# Patient Record
Sex: FEMALE | Race: BLACK OR AFRICAN AMERICAN | NOT HISPANIC OR LATINO | URBAN - METROPOLITAN AREA
[De-identification: names, ages, dates, MRNs, and addresses within clinical notes are randomized per-mention and may not be internally consistent; named-entity substitution may affect disease eponyms.]

---

## 2020-06-29 ENCOUNTER — OFFICE VISIT (OUTPATIENT)
Dept: URBAN - METROPOLITAN AREA TELEHEALTH 2 | Facility: TELEHEALTH | Age: 55
End: 2020-06-29
Payer: COMMERCIAL

## 2020-06-29 DIAGNOSIS — K92.1 RECTAL BLEEDING: ICD-10-CM

## 2020-06-29 DIAGNOSIS — K64.8 INTERNAL HEMORRHOID: ICD-10-CM

## 2020-06-29 DIAGNOSIS — K64.9 HEMORRHOIDS: ICD-10-CM

## 2020-06-29 PROCEDURE — 99213 OFFICE O/P EST LOW 20 MIN: CPT | Performed by: INTERNAL MEDICINE

## 2020-06-29 RX ORDER — SODIUM, POTASSIUM,MAG SULFATES 17.5-3.13G
177ML SOLUTION, RECONSTITUTED, ORAL ORAL
Qty: 177 MILLILITER | Refills: 0 | OUTPATIENT
Start: 2020-06-29 | End: 2020-06-30

## 2020-06-29 RX ORDER — MYCOPHENOLATE MOFETIL 250 MG/1
TAKE 1 CAPSULE BY ORAL ROUTE 2 TIMES A DAY FOR 90 DAYS CAPSULE ORAL 2
Qty: 180 | Refills: 1 | Status: ACTIVE | COMMUNITY
Start: 2020-02-14 | End: 2020-08-12

## 2020-06-29 RX ORDER — MINERAL OIL, PETROLATUM, PHENYLEPHRINE HCL 2.5; 140; 749 MG/G; MG/G; MG/G
1 APPLICATION OINTMENT TOPICAL TWICE A DAY
Qty: 1 TUBE | Refills: 1 | OUTPATIENT
Start: 2020-06-29 | End: 2020-07-13

## 2020-06-29 NOTE — HPI-OTHER HISTORIES
This is a 55 yo female with a PMHx of Autoimmune Hepatitis who reports consitipation after taking codiene for a tooth removal   She has also noted rectal bleeding she beleives is due to hemorrhoids.  She denies rectal pain.  Tucks had slightly improved her symptoms.  She is concerned about the larger size of the rectum and she wants to ensure the bleeding is not due to anything else.  She denies  abdominal pain and weight loss.

## 2020-07-02 ENCOUNTER — TELEPHONE ENCOUNTER (OUTPATIENT)
Dept: URBAN - METROPOLITAN AREA CLINIC 92 | Facility: CLINIC | Age: 55
End: 2020-07-02

## 2020-07-02 NOTE — HPI-OTHER HISTORIES
Olga Apologize as just saw this as suspect sent to us I suspect as we did our EMR conversion from [facility] to [facility] and they are still sorting some notes from the end of gw to ecw time.  Don't see date of the covid 19 when it was positive and hope you are doing well. Saw also thatv you dropped dose in march and those labs maybe done then in june normal still one a day. I am not opposed to that lower dose given the covid 19 issue rodrigo with labs stable but wanted to see how you are doing. The risk of dropping dose is that you could flare and need more which would be issue with covid 19. Wbc 6.0 hg 13.6 plat 256. Alb 4.7 and bun 17 and co2 27 and glu 101 and na 140 and alt 19 and ca 10.0, cr 0.9 and k 4.3 and alk 106 slightly up for lab, ast 25 and tb 0.5. Tried to call but went to [facility] and so am sending this note. Let us know how you are doing and can you do labs now.  Dr Lc Hayes

## 2020-07-19 ENCOUNTER — TELEPHONE ENCOUNTER (OUTPATIENT)
Dept: URBAN - METROPOLITAN AREA CLINIC 92 | Facility: CLINIC | Age: 55
End: 2020-07-19

## 2020-08-07 ENCOUNTER — OFFICE VISIT (OUTPATIENT)
Dept: URBAN - METROPOLITAN AREA SURGERY CENTER 16 | Facility: SURGERY CENTER | Age: 55
End: 2020-08-07

## 2020-09-19 ENCOUNTER — TELEPHONE ENCOUNTER (OUTPATIENT)
Dept: URBAN - METROPOLITAN AREA CLINIC 92 | Facility: CLINIC | Age: 55
End: 2020-09-19

## 2020-09-19 NOTE — HPI-TODAY'S VISIT:
This is a scheduled follow-up appointment for this patient, a 54 year old /Black female, after a previous visit on feb 14 2020, for an evaluation for elevated liver function tests and due to autoimmune liver disorder and alpha one carrier state. Recent liver function tests, total bilirubin, AST, and ALT, are elevated (see labs which were reviewed in the patients records ). The patient reports no significant symptoms related to the elevated LFT's.  The patient relates no significant family or personal history of liver disease. She states no history of new medications or alcohol use. The patient reports a personal history of no other habits that could cause liver damage.  Interval notes:     Prelim:  Areas to be Scanned:  Gallbladder: wnl.  Bile ducts: 4mm, wnl.  Liver: mild increased echoes of the boone.  Pancreas: wnl.  Spleen: 10.4 x 3.8cm, wnl.  Right Kidney: 10.1 x 3.8 x 5.9cm, wnl.  Other: TEXT   Impression: mpv w/ hepatopetal flow, rt & lt pv, hep art, ivc, hep v's, spl art & v are patent. no masses or fluid vis.   Feb 13 2020 wbc 6.6 hg 13.6 plat 272 and alb 4.4 and tb 0.6 and glu 95 and cl 105 and alk 99 and ast 26 and alt na 140 and alt 22 and co2 28 and cr 0.92 and tp 7.9. k 4.3.  Has only been on the meds once a day for the last 6m.   We should split the dose.  March 21 2019 na 141 and k 4.3 and cl 106 and co2 29 and glu 94 and cr 0.85 and alb 4.2 and tb 0.5 and ca 9.1 and ast 27 and alt 17 and alk 94.  March 2019 liver somewhat echogenic. Mild fatty infiltration. Vessels patent. Gb unremarkable. CBD 4mm abd right kidney 10.6cm and spleen 10.2cm.   Dec 31 2018 wbc 5.98 hg 13.3 plat 245.  Did the labs on 8th floor [place] and only cbc back: wbc 5.0 hg 13.2 and plat 238.  neutrophils 1.84 and lymph 2.35.   Aug 31 2018 u.s liver echotexture within normal limits and no mass lesions and gb normal and pancreas normal and spleen 10.2cm. No hydronephrosis right kidney. vessels patent.  Aug 30 2018 wbc 6.21 hg 13.6 plat 258 and alb 4.5 glu 90 cr 1.01 and k 4.5 and na 141 and ast 26 and alt 17 and tb 0.4. alk 109.   Prior she had cut herself down to cell cept 500mg po qd and did not tell us x 5 m and we had wanted her to be on cell cept 250mg po bid but she has been taking it as once a day.  She is on the calcium and vit d.  June 2018 labs ast 29 and alt 19 and alk 108 and tb 0.5 and so tolerated it. feb 2018 ast 29 and alt 23 and alk 119.  Full labs Feb 22 2018: alb 4.3, ast 27 and alt 20, ca 9.6,k 4.1, alk 119 cr 0.94, bun 16 glu 118.na 140. wbc 5.95 hg 13.2 plat 249, mcv 94.6.   She says bone density Feb 2019 was normal and due for this next year.  Did mammogram and it was ok.  Nov 2017 labs wbc 7.1 hg 12.8 plat 268 alb 4.4 alk 102, na 140 alt 23 and bun 17 cr 0.94, ast 30 and tb 0.5.   Took her almost 4 plus yr tracy to come off the steroids and so that is why I am not stopping meds.   Prior had reaction to ursodiol.  Need the labs when done to be sent to us and so that that we know what is going on.   She is aware that if has aih and if stop it all 80% relapse and could starting over with the steroids and issues,  Weight is getting more and could add to fat.  She says leona a lot and follow.  Duration of visit  minutes with greater than 50% of time spent reviewing chart and events with pt.

## 2020-09-25 ENCOUNTER — OFFICE VISIT (OUTPATIENT)
Dept: URBAN - METROPOLITAN AREA CLINIC 86 | Facility: CLINIC | Age: 55
End: 2020-09-25
Payer: COMMERCIAL

## 2020-09-25 ENCOUNTER — OFFICE VISIT (OUTPATIENT)
Dept: URBAN - METROPOLITAN AREA CLINIC 91 | Facility: CLINIC | Age: 55
End: 2020-09-25
Payer: COMMERCIAL

## 2020-09-25 DIAGNOSIS — K75.4 AUTOIMMUNE HEPATITIS: ICD-10-CM

## 2020-09-25 DIAGNOSIS — Z79.899 HIGH RISK MEDICATION USE: ICD-10-CM

## 2020-09-25 DIAGNOSIS — Z12.11 COLON CANCER SCREENING: ICD-10-CM

## 2020-09-25 DIAGNOSIS — Z98.890 HISTORY OF COLONOSCOPY: ICD-10-CM

## 2020-09-25 DIAGNOSIS — Z14.8 ALPHA-1-ANTITRYPSIN DEFICIENCY CARRIER: ICD-10-CM

## 2020-09-25 DIAGNOSIS — D57.3 SICKLE CELL TRAIT: ICD-10-CM

## 2020-09-25 DIAGNOSIS — I10 ESSENTIAL HYPERTENSION: ICD-10-CM

## 2020-09-25 DIAGNOSIS — K21.9 GASTROESOPHAGEAL REFLUX DISEASE WITHOUT ESOPHAGITIS: ICD-10-CM

## 2020-09-25 DIAGNOSIS — E61.1 IRON DEFICIENCY: ICD-10-CM

## 2020-09-25 DIAGNOSIS — Z71.89 VACCINE COUNSELING: ICD-10-CM

## 2020-09-25 DIAGNOSIS — E66.9 OBESITY (BMI 30-39.9): ICD-10-CM

## 2020-09-25 PROCEDURE — 93975 VASCULAR STUDY: CPT

## 2020-09-25 PROCEDURE — 3017F COLORECTAL CA SCREEN DOC REV: CPT

## 2020-09-25 PROCEDURE — 76705 ECHO EXAM OF ABDOMEN: CPT

## 2020-09-25 PROCEDURE — G8427 DOCREV CUR MEDS BY ELIG CLIN: HCPCS

## 2020-09-25 PROCEDURE — G8417 CALC BMI ABV UP PARAM F/U: HCPCS

## 2020-09-25 PROCEDURE — G9903 PT SCRN TBCO ID AS NON USER: HCPCS

## 2020-09-25 PROCEDURE — 99214 OFFICE O/P EST MOD 30 MIN: CPT

## 2020-09-25 RX ORDER — UBIDECARENONE/VIT E ACET 100MG-5
1 CAPSULE CAPSULE ORAL ONCE A DAY
Status: ACTIVE | COMMUNITY

## 2020-09-25 RX ORDER — DOCUSATE SODIUM 100 MG/1
1 CAPSULE AS NEEDED CAPSULE ORAL ONCE A DAY
Status: ACTIVE | COMMUNITY

## 2020-09-25 RX ORDER — MYCOPHENOLATE MOFETIL 250 MG/1
TAKE 1 CAPSULE BY ORAL ROUTE 2 TIMES A DAY FOR 90 DAYS CAPSULE ORAL ONCE A DAY
Refills: 1 | Status: ACTIVE | COMMUNITY
Start: 2020-02-14

## 2020-09-25 RX ORDER — MYCOPHENOLATE MOFETIL 250 MG/1
1 CAPSULES (STABLE ON LOWER DOSE NOW) CAPSULE ORAL ONCE A DAY
Qty: 90 | Refills: 1

## 2020-09-25 NOTE — HPI-TODAY'S VISIT:
This is a scheduled follow-up appointment for this patient, a 54 year old /Black female, after a previous visit on 2020, for an evaluation for elevated liver function tests and due to autoimmune liver disorder and alpha one carrier state.   The patient relates no significant family or personal history of liver disease. She states no history of new medications or alcohol use. The patient reports a personal history of no other habits that could cause liver damage.  Interval notes:   She did do labs and dropped off on .  Did the u.s today.  20: wbc 6.17 hg 13.4 plat 246. Bun 14 and co2 29 and na 141, ca 10.0, cr 0.9 lipase 42. Tb 0.4 and ast 25 and alk 106 and alt 17.   Prior alk 106 and ast 25 again.   Wbc 6.0 hg 13.6 plat 256. Alb 4.7 and bun 17 and co2 27 and glu 101 and na 140 and alt 19 and ca 10.0, cr 0.9 and k 4.3 and alk 106 slightly up for lab, ast 25 and tb 0.5.  Last u.s:  feb Prelim:  Areas to be Scanned:  Gallbladder: wnl.  Bile ducts: 4mm, wnl.  Liver: mild increased echoes of the boone.  Pancreas: wnl.  Spleen: 10.4 x 3.8cm, wnl.  Right Kidney: 10.1 x 3.8 x 5.9cm, wnl.  Other: TEXT   Impression: mpv w/ hepatopetal flow, rt & lt pv, hep art, ivc, hep v's, spl art & v are patent. no masses or fluid vis.   2020 wbc 6.6 hg 13.6 plat 272 and alb 4.4 and tb 0.6 and glu 95 and cl 105 and alk 99 and ast 26 and alt na 140 and alt 22 and co2 28 and cr 0.92 and tp 7.9. k 4.3.  Has only been on the meds once a day for the last 6m and she is on this dose and labs stable stay on the dose.  She did flu shot tuesday.  2019 na 141 and k 4.3 and cl 106 and co2 29 and glu 94 and cr 0.85 and alb 4.2 and tb 0.5 and ca 9.1 and ast 27 and alt 17 and alk 94.  2019 liver somewhat echogenic. Mild fatty infiltration. Vessels patent. Gb unremarkable. CBD 4mm abd right kidney 10.6cm and spleen 10.2cm.   Dec 31 2018 wbc 5.98 hg 13.3 plat 245.  Did the labs on 8th floor [place] and only cbc back: wbc 5.0 hg 13.2 and plat 238.  neutrophils 1.84 and lymph 2.35.  Covid 19 was this spring/summer and later diagnosed and 4 sick in lab.  Aug 31 2018 u.s liver echotexture within normal limits and no mass lesions and gb normal and pancreas normal and spleen 10.2cm. No hydronephrosis right kidney. vessels patent.  Aug 30 2018 wbc 6.21 hg 13.6 plat 258 and alb 4.5 glu 90 cr 1.01 and k 4.5 and na 141 and ast 26 and alt 17 and tb 0.4. alk 109.   Prior she had cut herself down to cell cept 500mg po qd and did not tell us x 5 m and we had wanted her to be on cell cept 250mg po bid but she has been taking it as once a day. Sicne labs stable staying on this.  She is on the calcium and vit d.  2018 labs ast 29 and alt 19 and alk 108 and tb 0.5 and so tolerated it. 2018 ast 29 and alt 23 and alk 119.  Full labs 2018: alb 4.3, ast 27 and alt 20, ca 9.6,k 4.1, alk 119 cr 0.94, bun 16 glu 118.na 140. wbc 5.95 hg 13.2 plat 249, mcv 94.6.   She says bone density 2019 was normal and due for this next year.  Did mammogram and it was ok.  2017 labs wbc 7.1 hg 12.8 plat 268 alb 4.4 alk 102, na 140 alt 23 and bun 17 cr 0.94, ast 30 and tb 0.5.   Took her almost 4 plus yr tracy to come off the steroids and so that is why I am not stopping meds.   Prior had reaction to ursodiol.  Plan: 1. Need the labs that she did and if stable then stay on dose. She will  let us know to refill next week once we see labs. 2. Check on the u.s. 3. If stable can do labs in . 4, Work on weight. 5.  She is aware that if has aih and if stop it all 80% relapse and could starting over with the steroids and issues. Levy is the lowest dose that works.  Labs back ast 29 and tb 0.6 an dalk 100 and na 141 and glu 95 and bun 16 and cr 0.96 and lipase 40 and alt 21 and tp 7.7 wbc6.06 hg 13.6 plat 260 and mcbv 94.7 so stay same dose.  Stressed to pt the need for social distancing and strict handwashing and wearing a mask and to follow any other new or added CDC recommendations as this is an evolving target.  Duration of visit  25 minutes via [place] video as healow did not work with greater than 50% of time spent reviewing chart and events with pt.   More than half of the face-to-face time used for counseling and coordination of care.  Patient seen today via telehealth by agreement and consent of patient in light of current COVID-19 pandemic. I used video conferencing during the visit. The patient encounter is appropriate and reasonable under the circumstances given the patient's particular presentation at this time. The patient has been advised of the followin) the potential risks and limitations of this mode of treatment (including but not limited to the absence of in-person examination); 2) the right to refuse telehealth services at any point without affecting the right to future care; 3) the right to receive in-person services, included immediately after this consultation if an urgent need arises; 4) information, including identifiable images or information from this telehealth consult, will only be shared in accordance with HIPAA regulations. Any and all of the patient's and/or patient's family member's questions on this issue have been answered. The patient has verbally consented to be treated via telehealth services. The patient has also been advised to contact this office for worsening conditions or problems, and seek emergency medical treatment and/or call 911 if the patient deems either necessary.

## 2020-09-26 ENCOUNTER — TELEPHONE ENCOUNTER (OUTPATIENT)
Dept: URBAN - METROPOLITAN AREA CLINIC 92 | Facility: CLINIC | Age: 55
End: 2020-09-26

## 2020-09-26 NOTE — HPI-OTHER HISTORIES
Leticia Lambert,  U/s: patent vessels.  No suspicious liver lesions. Spleen 10.8cm. Right kidney 10.5cm. No hydronephrosis. Pancreas normal. No gallstones. Common duct 4.4mm.  Liver normal echogenicity.   Dr Hayes

## 2020-10-16 ENCOUNTER — OFFICE VISIT (OUTPATIENT)
Dept: URBAN - METROPOLITAN AREA SURGERY CENTER 16 | Facility: SURGERY CENTER | Age: 55
End: 2020-10-16
Payer: COMMERCIAL

## 2020-10-16 DIAGNOSIS — K62.5 ANAL BLEEDING: ICD-10-CM

## 2020-10-16 PROCEDURE — G9937 DIG OR SURV COLSCO: HCPCS | Performed by: INTERNAL MEDICINE

## 2020-10-16 PROCEDURE — G8907 PT DOC NO EVENTS ON DISCHARG: HCPCS | Performed by: INTERNAL MEDICINE

## 2020-10-16 PROCEDURE — 45378 DIAGNOSTIC COLONOSCOPY: CPT | Performed by: INTERNAL MEDICINE

## 2020-12-07 ENCOUNTER — LAB OUTSIDE AN ENCOUNTER (OUTPATIENT)
Dept: URBAN - METROPOLITAN AREA CLINIC 86 | Facility: CLINIC | Age: 55
End: 2020-12-07

## 2021-01-02 ENCOUNTER — TELEPHONE ENCOUNTER (OUTPATIENT)
Dept: URBAN - METROPOLITAN AREA CLINIC 92 | Facility: CLINIC | Age: 56
End: 2021-01-02

## 2021-01-02 LAB
A/G RATIO: 1.5
ALBUMIN: 4.6
ALKALINE PHOSPHATASE: 111
ALT (SGPT): 27
AST (SGOT): 31
BASO (ABSOLUTE): 0
BASOS: 1
BILIRUBIN, TOTAL: 0.4
BUN/CREATININE RATIO: 16
BUN: 15
CALCIUM: 9.6
CARBON DIOXIDE, TOTAL: 23
CHLORIDE: 102
CREATININE: 0.94
EGFR IF AFRICN AM: 78
EGFR IF NONAFRICN AM: 68
EOS (ABSOLUTE): 0.1
EOS: 2
GLOBULIN, TOTAL: 3.1
GLUCOSE: 89
HEMATOCRIT: 40.9
HEMATOLOGY COMMENTS:: (no result)
HEMOGLOBIN: 13.4
IMMATURE CELLS: (no result)
IMMATURE GRANS (ABS): 0
IMMATURE GRANULOCYTES: 0
LIPASE: 39
LYMPHS (ABSOLUTE): 2.6
LYMPHS: 44
MCH: 31.9
MCHC: 32.8
MCV: 97
MONOCYTES(ABSOLUTE): 0.6
MONOCYTES: 10
NEUTROPHILS (ABSOLUTE): 2.5
NEUTROPHILS: 43
NRBC: (no result)
PLATELETS: 238
POTASSIUM: 4.7
PROTEIN, TOTAL: 7.7
RBC: 4.2
RDW: 11.6
SODIUM: 141
WBC: 5.8

## 2021-01-02 NOTE — HPI-TODAY'S VISIT:
Dear Petra Decker The results of your recent tests are explained below: dec 7 labs just arrived. not sure why so delayed. wbc 5.8 hg 13.4 plat 238. mcv 97.  tb 0.4 and alk 111 and ast 31 and alt 27 and ideal alt <25. na 141 and k 4.7 and cl 102 and co2 23 and bun 15 and cr 0.94. glu 89. Lipase 39 normal.  ast 25 and alt 17 prior so little higher. Any new meds? Weight change with pandemic? Let us know.

## 2021-01-11 ENCOUNTER — LAB OUTSIDE AN ENCOUNTER (OUTPATIENT)
Dept: URBAN - METROPOLITAN AREA CLINIC 92 | Facility: CLINIC | Age: 56
End: 2021-01-11

## 2021-03-07 ENCOUNTER — LAB OUTSIDE AN ENCOUNTER (OUTPATIENT)
Dept: URBAN - METROPOLITAN AREA CLINIC 86 | Facility: CLINIC | Age: 56
End: 2021-03-07

## 2021-03-15 ENCOUNTER — LAB OUTSIDE AN ENCOUNTER (OUTPATIENT)
Dept: URBAN - METROPOLITAN AREA CLINIC 86 | Facility: CLINIC | Age: 56
End: 2021-03-15

## 2021-03-25 ENCOUNTER — OFFICE VISIT (OUTPATIENT)
Dept: URBAN - METROPOLITAN AREA TELEHEALTH 2 | Facility: TELEHEALTH | Age: 56
End: 2021-03-25
Payer: COMMERCIAL

## 2021-03-25 ENCOUNTER — OFFICE VISIT (OUTPATIENT)
Dept: URBAN - METROPOLITAN AREA CLINIC 91 | Facility: CLINIC | Age: 56
End: 2021-03-25
Payer: COMMERCIAL

## 2021-03-25 DIAGNOSIS — K75.4 AUTOIMMUNE HEPATITIS: ICD-10-CM

## 2021-03-25 DIAGNOSIS — Z14.8 ALPHA-1-ANTITRYPSIN DEFICIENCY CARRIER: ICD-10-CM

## 2021-03-25 DIAGNOSIS — Z79.899 HIGH RISK MEDICATION USE: ICD-10-CM

## 2021-03-25 DIAGNOSIS — E61.1 IRON DEFICIENCY: ICD-10-CM

## 2021-03-25 PROCEDURE — 93975 VASCULAR STUDY: CPT

## 2021-03-25 PROCEDURE — 99214 OFFICE O/P EST MOD 30 MIN: CPT

## 2021-03-25 PROCEDURE — 76705 ECHO EXAM OF ABDOMEN: CPT

## 2021-03-25 RX ORDER — DOCUSATE SODIUM 100 MG/1
1 CAPSULE AS NEEDED CAPSULE ORAL ONCE A DAY
Status: ACTIVE | COMMUNITY

## 2021-03-25 RX ORDER — UBIDECARENONE/VIT E ACET 100MG-5
1 CAPSULE CAPSULE ORAL ONCE A DAY
Status: ACTIVE | COMMUNITY

## 2021-03-25 RX ORDER — MYCOPHENOLATE MOFETIL 250 MG/1
1 CAPSULES (STABLE ON LOWER DOSE NOW) CAPSULE ORAL ONCE A DAY
Qty: 90 | Refills: 1 | Status: ACTIVE | COMMUNITY

## 2021-03-25 RX ORDER — MYCOPHENOLATE MOFETIL 250 MG/1
1 CAPSULES (STABLE ON LOWER DOSE NOW) CAPSULE ORAL ONCE A DAY
Qty: 90 | Refills: 1

## 2021-03-25 NOTE — EXAM-PHYSICAL EXAM
Telemed: Gen: no distress Eyes: anicteric and normal lids Mouth: no thrush CV: no chest pain Lungs: no wheezes Abd: No tenderness or pain reported Ext: no edema Neuro: Responsive and no distress

## 2021-03-25 NOTE — HPI-TODAY'S VISIT:
This is a scheduled follow-up appointment for this patient, a 55 year old /Black female, after a previous visit for an evaluation for elevated liver function tests and due to autoimmune liver disorder and alpha one carrier state.   She has not done the vaccine yet.  She had the covid 19 illness.  Interval notes:    Did the u.s today.  2021 wbc 5.87 and hg 13.9 and palt 272 and mcv 97.5 and neutrophils 2.47 and can't read the cmp panel but able to get other print out and think ast 29 and tb 0.5 and alk 99 and na 139 and co2 23 and glu 99 and alb 4.6 and bun 19 and cr 0.94 and alt 21 and cl 103 and tp 8.1.  Dec 7 2020  labs just arrived. not sure why so delayed. wbc 5.8 hg 13.4 plat 238. mcv 97.  tb 0.4 and alk 111 and ast 31 and alt 27 and ideal alt less than25. na 141 and k 4.7 and cl 102 and co2 23 and bun 15 and cr 0.94. glu 89. Lipase 39 normal.  ast 25 and alt 17 prior so little higher.   2020: Labs back ast 29 and tb 0.6 an dalk 100 and na 141 and glu 95 and bun 16 and cr 0.96 and lipase 40 and alt 21 and tp 7.7 wbc6.06 hg 13.6 plat 260 and mcbv 94.7 so stay same dose.  Covid 19 was earlier in .  20: wbc 6.17 hg 13.4 plat 246. Bun 14 and co2 29 and na 141, ca 10.0, cr 0.9 lipase 42. Tb 0.4 and ast 25 and alk 106 and alt 17.   Prior alk 106 and ast 25 again.   Wbc 6.0 hg 13.6 plat 256. Alb 4.7 and bun 17 and co2 27 and glu 101 and na 140 and alt 19 and ca 10.0, cr 0.9 and k 4.3 and alk 106 slightly up for lab, ast 25 and tb 0.5.  2020 u.s u.s patent vessels and no suspicious liver lesion. No discrete liver leison. No stones and no duct dilaiton and duct 4,4mm and pancreas normal and spleen 10.8cm and vessels open.  :  Areas to be Scanned:  Gallbladder: wnl.  Bile ducts: 4mm, wnl.  Liver: mild increased echoes of the boone.  Pancreas: wnl.  Spleen: 10.4 x 3.8cm, wnl.  Right Kidney: 10.1 x 3.8 x 5.9cm, wnl.  Other: TEXT   Impression: mpv w/ hepatopetal flow, rt & lt pv, hep art, ivc, hep v's, spl art & v are patent. no masses or fluid vis.   2020 wbc 6.6 hg 13.6 plat 272 and alb 4.4 and tb 0.6 and glu 95 and cl 105 and alk 99 and ast 26 and alt na 140 and alt 22 and co2 28 and cr 0.92 and tp 7.9. k 4.3.  Has only been on the meds once a day for the last 6m and she is on this dose and labs stable stay on the dose.  2019 na 141 and k 4.3 and cl 106 and co2 29 and glu 94 and cr 0.85 and alb 4.2 and tb 0.5 and ca 9.1 and ast 27 and alt 17 and alk 94.  2019 liver somewhat echogenic. Mild fatty infiltration. Vessels patent. Gb unremarkable. CBD 4mm abd right kidney 10.6cm and spleen 10.2cm.   Dec 31 2018 wbc 5.98 hg 13.3 plat 245.  Did the labs on 8th floor [place] and only cbc back: wbc 5.0 hg 13.2 and plat 238.  neutrophils 1.84 and lymph 2.35.  Covid 19 was this spring/summer and later diagnosed and 4 sick in lab.  Aug 31 2018 u.s liver echotexture within normal limits and no mass lesions and gb normal and pancreas normal and spleen 10.2cm. No hydronephrosis right kidney. vessels patent.  Aug 30 2018 wbc 6.21 hg 13.6 plat 258 and alb 4.5 glu 90 cr 1.01 and k 4.5 and na 141 and ast 26 and alt 17 and tb 0.4. alk 109.   Weight is stable.  Prior she had cut herself down to cell cept 500mg po qd and did not tell us x 5 m  last year and we had wanted her to be on cell cept 250mg po bid but she has been taking it as cell cept 250mg  once a day. Since labs stable staying on this.  She is on the calcium and vit d.  2018 labs ast 29 and alt 19 and alk 108 and tb 0.5 and so tolerated it. 2018 ast 29 and alt 23 and alk 119.  Full labs 2018: alb 4.3, ast 27 and alt 20, ca 9.6,k 4.1, alk 119 cr 0.94, bun 16 glu 118.na 140. wbc 5.95 hg 13.2 plat 249, mcv 94.6.   She says bone density 2019 was normal and due for this next year.  Did mammogram and it was ok.  2017 labs wbc 7.1 hg 12.8 plat 268 alb 4.4 alk 102, na 140 alt 23 and bun 17 cr 0.94, ast 30 and tb 0.5.   Took her almost 4 plus yr tracy to come off the steroids and so that is why I am not stopping the isp meds. We need to see the lowest dose for her.   Prior had reaction to ursodiol.  Plan: 1. Plan for labs in 3 and 6m. 2. Check on the u.s. that she did and again in 6m. 3. Work on weight. 4.  She is aware that if has aih and if stop it all 80% relapse and could starting over with the steroids and issues. Levy is the lowest dose that works.    Stressed to pt the need for social distancing and strict handwashing and wearing a mask and to follow any other new or added CDC recommendations as this is an evolving target.  Duration of visit  30 minutes from 1102 to 1132 am by clock today via [place] video over a n audio and then video session as isidoro did not work with greater than 50% of time spent reviewing chart and events with pt.   More than half of the face-to-face time used for counseling and coordination of care.  Patient seen today via telehealth by agreement and consent of patient in light of current COVID-19 pandemic. I used video conferencing during the visit. The patient encounter is appropriate and reasonable under the circumstances given the patient's particular presentation at this time. The patient has been advised of the followin) the potential risks and limitations of this mode of treatment (including but not limited to the absence of in-person examination); 2) the right to refuse telehealth services at any point without affecting the right to future care; 3) the right to receive in-person services, included immediately after this consultation if an urgent need arises; 4) information, including identifiable images or information from this telehealth consult, will only be shared in accordance with HIPAA regulations. Any and all of the patient's and/or patient's family member's questions on this issue have been answered. The patient has verbally consented to be treated via telehealth services. The patient has also been advised to contact this office for worsening conditions or problems, and seek emergency medical treatment and/or call 911 if the patient deems either necessary.

## 2021-03-26 ENCOUNTER — TELEPHONE ENCOUNTER (OUTPATIENT)
Dept: URBAN - METROPOLITAN AREA CLINIC 92 | Facility: CLINIC | Age: 56
End: 2021-03-26

## 2021-03-26 NOTE — HPI-TODAY'S VISIT:
Leticia Decker, March 25 2021: u.s: visualized pancreas portions are normal in size. No free fluid. Liver vessels patent as expected. Gallbladder appeared normal. Right kidney 10.2cm and no hydronephrosis. Spleen normal in size and measured 10cm. Overall normal doppler and no focal hepatic abnormality. Dr Hayes

## 2021-03-27 ENCOUNTER — TELEPHONE ENCOUNTER (OUTPATIENT)
Dept: URBAN - METROPOLITAN AREA CLINIC 92 | Facility: CLINIC | Age: 56
End: 2021-03-27

## 2021-03-27 NOTE — HPI-TODAY'S VISIT:
Dear Petra Decker,  Dec 7 2020 labs sent to curiously an employee [employer] account so that was why I did not get.  wbc 5.8 hg 13.4 plat 238.  glu 89 and cr 0.94 and na 141 and k 47 and cl 102 and co2 23 and alb 4.6 and tb 0.4 and alk 111 and ast 31 and alt 27.  Lipase 39.  Until sure that this is fixed, need when you do labs to let Emy know to look up in [facility].  Thanks  Nona Hayes.

## 2021-06-14 ENCOUNTER — LAB OUTSIDE AN ENCOUNTER (OUTPATIENT)
Dept: URBAN - METROPOLITAN AREA TELEHEALTH 2 | Facility: TELEHEALTH | Age: 56
End: 2021-06-14

## 2021-09-13 ENCOUNTER — LAB OUTSIDE AN ENCOUNTER (OUTPATIENT)
Dept: URBAN - METROPOLITAN AREA TELEHEALTH 2 | Facility: TELEHEALTH | Age: 56
End: 2021-09-13

## 2021-09-14 ENCOUNTER — LAB OUTSIDE AN ENCOUNTER (OUTPATIENT)
Dept: URBAN - METROPOLITAN AREA TELEHEALTH 2 | Facility: TELEHEALTH | Age: 56
End: 2021-09-14

## 2021-10-07 ENCOUNTER — OFFICE VISIT (OUTPATIENT)
Dept: URBAN - METROPOLITAN AREA CLINIC 91 | Facility: CLINIC | Age: 56
End: 2021-10-07

## 2021-10-07 ENCOUNTER — OFFICE VISIT (OUTPATIENT)
Dept: URBAN - METROPOLITAN AREA TELEHEALTH 2 | Facility: TELEHEALTH | Age: 56
End: 2021-10-07

## 2021-10-07 NOTE — HPI-TODAY'S VISIT:
Dear Petra Decker,  Dec 7 2020 labs sent to curiously an employee [employer] account so that was why I did not get.  wbc 5.8 hg 13.4 plat 238.  glu 89 and cr 0.94 and na 141 and k 47 and cl 102 and co2 23 and alb 4.6 and tb 0.4 and alk 111 and ast 31 and alt 27.  Lipase 39.  Until sure that this is fixed, need when you do labs to let Emy know to look up in [facility].  Thanks  Nona Hayes. This is a scheduled follow-up appointment for this patient, a 55 year old /Black female, after a previous visit for an evaluation for elevated liver function tests and due to autoimmune liver disorder and alpha one carrier state.   She has not done the vaccine yet.  She had the covid 19 illness.  Interval notes:    Did the u.s today.  2021 wbc 5.87 and hg 13.9 and palt 272 and mcv 97.5 and neutrophils 2.47 and can't read the cmp panel but able to get other print out and think ast 29 and tb 0.5 and alk 99 and na 139 and co2 23 and glu 99 and alb 4.6 and bun 19 and cr 0.94 and alt 21 and cl 103 and tp 8.1.  Dec 7 2020  labs just arrived. not sure why so delayed. wbc 5.8 hg 13.4 plat 238. mcv 97.  tb 0.4 and alk 111 and ast 31 and alt 27 and ideal alt less than25. na 141 and k 4.7 and cl 102 and co2 23 and bun 15 and cr 0.94. glu 89. Lipase 39 normal.  ast 25 and alt 17 prior so little higher.   2020: Labs back ast 29 and tb 0.6 an dalk 100 and na 141 and glu 95 and bun 16 and cr 0.96 and lipase 40 and alt 21 and tp 7.7 wbc6.06 hg 13.6 plat 260 and mcbv 94.7 so stay same dose.  Covid 19 was earlier in .  20: wbc 6.17 hg 13.4 plat 246. Bun 14 and co2 29 and na 141, ca 10.0, cr 0.9 lipase 42. Tb 0.4 and ast 25 and alk 106 and alt 17.   Prior alk 106 and ast 25 again.   Wbc 6.0 hg 13.6 plat 256. Alb 4.7 and bun 17 and co2 27 and glu 101 and na 140 and alt 19 and ca 10.0, cr 0.9 and k 4.3 and alk 106 slightly up for lab, ast 25 and tb 0.5.  2020 u.s u.s patent vessels and no suspicious liver lesion. No discrete liver leison. No stones and no duct dilaiton and duct 4,4mm and pancreas normal and spleen 10.8cm and vessels open.  feb Prelim:  Areas to be Scanned:  Gallbladder: wnl.  Bile ducts: 4mm, wnl.  Liver: mild increased echoes of the boone.  Pancreas: wnl.  Spleen: 10.4 x 3.8cm, wnl.  Right Kidney: 10.1 x 3.8 x 5.9cm, wnl.  Other: TEXT   Impression: mpv w/ hepatopetal flow, rt & lt pv, hep art, ivc, hep v's, spl art & v are patent. no masses or fluid vis.   2020 wbc 6.6 hg 13.6 plat 272 and alb 4.4 and tb 0.6 and glu 95 and cl 105 and alk 99 and ast 26 and alt na 140 and alt 22 and co2 28 and cr 0.92 and tp 7.9. k 4.3.  Has only been on the meds once a day for the last 6m and she is on this dose and labs stable stay on the dose.  2019 na 141 and k 4.3 and cl 106 and co2 29 and glu 94 and cr 0.85 and alb 4.2 and tb 0.5 and ca 9.1 and ast 27 and alt 17 and alk 94.  2019 liver somewhat echogenic. Mild fatty infiltration. Vessels patent. Gb unremarkable. CBD 4mm abd right kidney 10.6cm and spleen 10.2cm.   Dec 31 2018 wbc 5.98 hg 13.3 plat 245.  Did the labs on 8th floor [place] and only cbc back: wbc 5.0 hg 13.2 and plat 238.  neutrophils 1.84 and lymph 2.35.  Covid 19 was this spring/summer and later diagnosed and 4 sick in lab.  Aug 31 2018 u.s liver echotexture within normal limits and no mass lesions and gb normal and pancreas normal and spleen 10.2cm. No hydronephrosis right kidney. vessels patent.  Aug 30 2018 wbc 6.21 hg 13.6 plat 258 and alb 4.5 glu 90 cr 1.01 and k 4.5 and na 141 and ast 26 and alt 17 and tb 0.4. alk 109.   Weight is stable.  Prior she had cut herself down to cell cept 500mg po qd and did not tell us x 5 m  last year and we had wanted her to be on cell cept 250mg po bid but she has been taking it as cell cept 250mg  once a day. Since labs stable staying on this.  She is on the calcium and vit d.  2018 labs ast 29 and alt 19 and alk 108 and tb 0.5 and so tolerated it. 2018 ast 29 and alt 23 and alk 119.  Full labs 2018: alb 4.3, ast 27 and alt 20, ca 9.6,k 4.1, alk 119 cr 0.94, bun 16 glu 118.na 140. wbc 5.95 hg 13.2 plat 249, mcv 94.6.   She says bone density 2019 was normal and due for this next year.  Did mammogram and it was ok.  2017 labs wbc 7.1 hg 12.8 plat 268 alb 4.4 alk 102, na 140 alt 23 and bun 17 cr 0.94, ast 30 and tb 0.5.   Took her almost 4 plus yr tracy to come off the steroids and so that is why I am not stopping the isp meds. We need to see the lowest dose for her.   Prior had reaction to ursodiol.  Plan: 1. Plan for labs in 3 and 6m. 2. Check on the u.s. that she did and again in 6m. 3. Work on weight. 4.  She is aware that if has aih and if stop it all 80% relapse and could starting over with the steroids and issues. Levy is the lowest dose that works.    Stressed to pt the need for social distancing and strict handwashing and wearing a mask and to follow any other new or added CDC recommendations as this is an evolving target.  Duration of visit  30 minutes from 1102 to 1132 am by clock today via [employer] video over a n audio and then video session as isidoro did not work with greater than 50% of time spent reviewing chart and events with pt.   More than half of the face-to-face time used for counseling and coordination of care.  Patient seen today via telehealth by agreement and consent of patient in light of current COVID-19 pandemic. I used video conferencing during the visit. The patient encounter is appropriate and reasonable under the circumstances given the patient's particular presentation at this time. The patient has been advised of the followin) the potential risks and limitations of this mode of treatment (including but not limited to the absence of in-person examination); 2) the right to refuse telehealth services at any point without affecting the right to future care; 3) the right to receive in-person services, included immediately after this consultation if an urgent need arises; 4) information, including identifiable images or information from this telehealth consult, will only be shared in accordance with HIPAA regulations. Any and all of the patient's and/or patient's family member's questions on this issue have been answered. The patient has verbally consented to be treated via telehealth services. The patient has also been advised to contact this office for worsening conditions or problems, and seek emergency medical treatment and/or call 911 if the patient deems either necessary.

## 2021-10-19 ENCOUNTER — TELEPHONE ENCOUNTER (OUTPATIENT)
Dept: URBAN - METROPOLITAN AREA CLINIC 92 | Facility: CLINIC | Age: 56
End: 2021-10-19

## 2021-10-19 ENCOUNTER — OFFICE VISIT (OUTPATIENT)
Dept: URBAN - METROPOLITAN AREA CLINIC 91 | Facility: CLINIC | Age: 56
End: 2021-10-19
Payer: COMMERCIAL

## 2021-10-19 DIAGNOSIS — K75.4 AUTOIMMUNE HEPATITIS: ICD-10-CM

## 2021-10-19 PROCEDURE — 93975 VASCULAR STUDY: CPT

## 2021-10-19 PROCEDURE — 76705 ECHO EXAM OF ABDOMEN: CPT

## 2021-10-19 RX ORDER — DOCUSATE SODIUM 100 MG/1
1 CAPSULE AS NEEDED CAPSULE ORAL ONCE A DAY
Status: ACTIVE | COMMUNITY

## 2021-10-19 RX ORDER — MYCOPHENOLATE MOFETIL 250 MG/1
1 CAPSULES (STABLE ON LOWER DOSE NOW) CAPSULE ORAL ONCE A DAY
Qty: 90 | Refills: 1 | Status: ACTIVE | COMMUNITY

## 2021-10-19 RX ORDER — UBIDECARENONE/VIT E ACET 100MG-5
1 CAPSULE CAPSULE ORAL ONCE A DAY
Status: ACTIVE | COMMUNITY

## 2021-10-19 NOTE — HPI-TODAY'S VISIT:
Leticia Decker, October 19 ultrasound shows echogenicity to be within normal limits and no focal lesions. Common bile duct was 4 mm which is normal. Gallbladder appeared unremarkable. No ascites was seen. Visualized pancreas portions appeared unremarkable. Right kidney 10.6 cm with no hydronephrosis. Liver vessels patent. Overall they felt that you had a normal exam with patent vessels. Dr. Hayes

## 2021-10-24 ENCOUNTER — TELEPHONE ENCOUNTER (OUTPATIENT)
Dept: URBAN - METROPOLITAN AREA CLINIC 92 | Facility: CLINIC | Age: 56
End: 2021-10-24

## 2021-10-24 NOTE — HPI-TODAY'S VISIT:
Dear Petra Decker, I saw the note that you attached with the labs and therein you mentioned you had missed 3 days of medicines before the labs had been done.  We also see that you mention that you had not fasted. These labs from October 18 show a white blood cell count of 5.71 hemoglobin 13.6 plate count 257 MCV 97.4.  Neutrophils 2.56 and lymphocytes 2.49. Your total bilirubin is normal at 0.5 and alkaline phosphatase slightly up at 105.  Your AST was 25 and ALT was 18.  Sodium 143 potassium 4.2 CO2 30 which is slightly up.  Glucose elevated at 115 but as you mentioned not fasting.  Albumin normal at 4.6.  BUN normal at 16.  Creatinine 0.95 also normal.  Lipase normal at 36.  Total protein normal at 8.4. I saw also that you missed or canceled your appointment this week and that you have no pending appointment. Please call Emy at [phone] extension [phone] to get this rebooked.   As you know with the upcoming holiday season coming up and my  usual [place] vacation time, and during this time, the rebook appointments with me alone may be a little tight for the next short period of time but if we keep your options open as Elizabeth and Maribel are also here, we can get you seen and then get back on track for your usual 6-month visits. Dr. Hayes

## 2021-12-21 ENCOUNTER — ERX REFILL RESPONSE (OUTPATIENT)
Dept: URBAN - METROPOLITAN AREA CLINIC 86 | Facility: CLINIC | Age: 56
End: 2021-12-21

## 2021-12-21 RX ORDER — MYCOPHENOLATE MOFETIL 250 MG/1
TAKE 1 CAPSULE BY MOUTH EVERY DAY CAPSULE ORAL
Qty: 90 CAPSULE | Refills: 1 | OUTPATIENT

## 2021-12-21 RX ORDER — MYCOPHENOLATE MOFETIL 250 MG/1
1 CAPSULES (STABLE ON LOWER DOSE NOW) CAPSULE ORAL ONCE A DAY
Qty: 90 | Refills: 1 | OUTPATIENT

## 2022-05-30 ENCOUNTER — ERX REFILL RESPONSE (OUTPATIENT)
Dept: URBAN - METROPOLITAN AREA CLINIC 86 | Facility: CLINIC | Age: 57
End: 2022-05-30

## 2022-05-30 RX ORDER — MYCOPHENOLATE MOFETIL 250 MG/1
TAKE 1 CAPSULE BY MOUTH EVERY DAY CAPSULE ORAL
Qty: 90 CAPSULE | Refills: 1 | OUTPATIENT

## 2022-05-31 ENCOUNTER — TELEPHONE ENCOUNTER (OUTPATIENT)
Dept: URBAN - METROPOLITAN AREA CLINIC 92 | Facility: CLINIC | Age: 57
End: 2022-05-31

## 2022-05-31 ENCOUNTER — WEB ENCOUNTER (OUTPATIENT)
Dept: URBAN - METROPOLITAN AREA CLINIC 86 | Facility: CLINIC | Age: 57
End: 2022-05-31

## 2022-05-31 ENCOUNTER — OFFICE VISIT (OUTPATIENT)
Dept: URBAN - METROPOLITAN AREA CLINIC 91 | Facility: CLINIC | Age: 57
End: 2022-05-31
Payer: COMMERCIAL

## 2022-05-31 ENCOUNTER — OFFICE VISIT (OUTPATIENT)
Dept: URBAN - METROPOLITAN AREA CLINIC 86 | Facility: CLINIC | Age: 57
End: 2022-05-31
Payer: COMMERCIAL

## 2022-05-31 DIAGNOSIS — I10 ESSENTIAL HYPERTENSION: ICD-10-CM

## 2022-05-31 DIAGNOSIS — K75.4 AUTOIMMUNE HEPATITIS: ICD-10-CM

## 2022-05-31 DIAGNOSIS — Z98.890 H/O ABDOMINAL SURGERY: ICD-10-CM

## 2022-05-31 DIAGNOSIS — Z98.890 HISTORY OF COLONOSCOPY: ICD-10-CM

## 2022-05-31 DIAGNOSIS — D57.3 SICKLE CELL TRAIT: ICD-10-CM

## 2022-05-31 DIAGNOSIS — E66.9 OBESITY (BMI 30-39.9): ICD-10-CM

## 2022-05-31 DIAGNOSIS — Z79.899 HIGH RISK MEDICATION USE: ICD-10-CM

## 2022-05-31 DIAGNOSIS — K21.9 GASTROESOPHAGEAL REFLUX DISEASE WITHOUT ESOPHAGITIS: ICD-10-CM

## 2022-05-31 DIAGNOSIS — Z14.8 ALPHA-1-ANTITRYPSIN DEFICIENCY CARRIER: ICD-10-CM

## 2022-05-31 DIAGNOSIS — K21.9 ACID REFLUX: ICD-10-CM

## 2022-05-31 DIAGNOSIS — E61.1 IRON DEFICIENCY: ICD-10-CM

## 2022-05-31 DIAGNOSIS — Z71.89 VACCINE COUNSELING: ICD-10-CM

## 2022-05-31 DIAGNOSIS — Z12.11 COLON CANCER SCREENING: ICD-10-CM

## 2022-05-31 PROCEDURE — 93975 VASCULAR STUDY: CPT

## 2022-05-31 PROCEDURE — 76705 ECHO EXAM OF ABDOMEN: CPT

## 2022-05-31 PROCEDURE — 99214 OFFICE O/P EST MOD 30 MIN: CPT

## 2022-05-31 RX ORDER — MYCOPHENOLATE MOFETIL 250 MG/1
1 CAPSULES (STABLE ON LOWER DOSE NOW) CAPSULE ORAL ONCE A DAY
Qty: 90 | Refills: 1

## 2022-05-31 RX ORDER — UBIDECARENONE/VIT E ACET 100MG-5
1 CAPSULE CAPSULE ORAL ONCE A DAY
Status: ACTIVE | COMMUNITY

## 2022-05-31 RX ORDER — DOCUSATE SODIUM 100 MG/1
1 CAPSULE AS NEEDED CAPSULE ORAL ONCE A DAY
Status: ACTIVE | COMMUNITY

## 2022-05-31 RX ORDER — MYCOPHENOLATE MOFETIL 250 MG/1
1 CAPSULES (STABLE ON LOWER DOSE NOW) CAPSULE ORAL ONCE A DAY
Qty: 90 | Refills: 1 | Status: ACTIVE | COMMUNITY

## 2022-05-31 RX ORDER — MYCOPHENOLATE MOFETIL 250 MG/1
TAKE 1 CAPSULE BY MOUTH EVERY DAY CAPSULE ORAL
Qty: 90 CAPSULE | Refills: 1 | Status: ACTIVE | COMMUNITY

## 2022-05-31 NOTE — HPI-TODAY'S VISIT:
Leticia Decker, May 31 ultrasound shows no gallstones. Common bile duct is not dilated at 4 mm.  Liver was homogeneous in echotexture and with no focal abnormality. Imaged pancreas portions were unremarkable. Right kidney was 10.5 cm with no hydronephrosis. Spleen normal at 10.1 cm. Liver vessels patent. Overall the felt it was a negative right upper quadrant sonogram with no focal lesion seen. Good to note.   Dr. Hayes

## 2022-05-31 NOTE — EXAM-PHYSICAL EXAM
Gen: awake and responsive. Eyes: anicteric, normal lids. Mouth: covered with mask. Nose: covered with mask. Hearing: intact grossly. Neck: trachea midline and no jvd. CV: RRR no s3. Lungs: clear. No wheezes, Abd: Soft, nabs, nr, NT. No hsm. Ext: no sig edema, some palm erythema. Neuro: moves all 4 ext grossly. No asterixis. Skin: no pruritis and some palm erythema.

## 2022-05-31 NOTE — HPI-TODAY'S VISIT:
This is a scheduled follow-up appointment for this patient, a 56 year old /Black female, after a previous visit March 2021 for an evaluation for elevated liver function tests and due to autoimmune liver disorder and alpha one carrier state.   She is now here.  Doing the u.s today at 830.  May 26 labsL ast 23  tb 0.6. alk 94 nd na 142, co2 28 glu 97  alb 4.5 and bun 15 and k 4.4 and cr 0.95 and lipase 41 and alt 11 and cl 105, wbc 6.58 hg 13.4, plat 255.  Cellcept once a day still.  She did the covid 19 vaccine. She went to [place] and got it around [place].  She will consider booster.  October 18 2021 show a white blood cell count of 5.71 hemoglobin 13.6 plate count 257 MCV 97.4.  Neutrophils 2.56 and lymphocytes 2.49. Your total bilirubin is normal at 0.5 and alkaline phosphatase slightly up at 105.  Your AST was 25 and ALT was 18.  Sodium 143 potassium 4.2 CO2 30 which is slightly up.  Glucose elevated at 115 but as you mentioned not fasting.  Albumin normal at 4.6.  BUN normal at 16.  Creatinine 0.95 also normal.  Lipase normal at 36.  Total protein normal at 8.4.  October 19 2021 ultrasound shows echogenicity to be within normal limits and no focal lesions. Common bile duct was 4 mm which is normal. Gallbladder appeared unremarkable. No ascites was seen. Visualized pancreas portions appeared unremarkable. Right kidney 10.6 cm with no hydronephrosis. Liver vessels patent. Overall they felt that you had a normal exam with patent vessels.  March 19 2021 wbc 5.87 and hg 13.9 and palt 272 and mcv 97.5 and neutrophils 2.47 and can't read the cmp panel but able to get other print out and think ast 29 and tb 0.5 and alk 99 and na 139 and co2 23 and glu 99 and alb 4.6 and bun 19 and cr 0.94 and alt 21 and cl 103 and tp 8.1.  Dec 7 2020  labs just arrived. not sure why so delayed. wbc 5.8 hg 13.4 plat 238. mcv 97.  tb 0.4 and alk 111 and ast 31 and alt 27 and ideal alt less than25. na 141 and k 4.7 and cl 102 and co2 23 and bun 15 and cr 0.94. glu 89. Lipase 39 normal.  ast 25 and alt 17 prior so little higher.   Sept 2020: Labs back ast 29 and tb 0.6 an dalk 100 and na 141 and glu 95 and bun 16 and cr 0.96 and lipase 40 and alt 21 and tp 7.7 wbc6.06 hg 13.6 plat 260 and mcbv 94.7 so stay same dose.  Covid 19 was earlier in 2020.  7-16-20: wbc 6.17 hg 13.4 plat 246. Bun 14 and co2 29 and na 141, ca 10.0, cr 0.9 lipase 42. Tb 0.4 and ast 25 and alk 106 and alt 17.   Prior alk 106 and ast 25 again.   Wbc 6.0 hg 13.6 plat 256. Alb 4.7 and bun 17 and co2 27 and glu 101 and na 140 and alt 19 and ca 10.0, cr 0.9 and k 4.3 and alk 106 slightly up for lab, ast 25 and tb 0.5.  Sept 2020 u.s u.s patent vessels and no suspicious liver lesion. No discrete liver leison. No stones and no duct dilaiton and duct 4,4mm and pancreas normal and spleen 10.8cm and vessels open.  feb Prelim:  Areas to be Scanned:  Gallbladder: wnl.  Bile ducts: 4mm, wnl.  Liver: mild increased echoes of the boone.  Pancreas: wnl.  Spleen: 10.4 x 3.8cm, wnl.  Right Kidney: 10.1 x 3.8 x 5.9cm, wnl.  Other: TEXT   Impression: mpv w/ hepatopetal flow, rt & lt pv, hep art, ivc, hep v's, spl art & v are patent. no masses or fluid vis.   Feb 13 2020 wbc 6.6 hg 13.6 plat 272 and alb 4.4 and tb 0.6 and glu 95 and cl 105 and alk 99 and ast 26 and alt na 140 and alt 22 and co2 28 and cr 0.92 and tp 7.9. k 4.3.  Has only been on the meds once a day for the last 6m and she is on this dose and labs stable stay on the dose.  March 21 2019 na 141 and k 4.3 and cl 106 and co2 29 and glu 94 and cr 0.85 and alb 4.2 and tb 0.5 and ca 9.1 and ast 27 and alt 17 and alk 94.  March 2019 liver somewhat echogenic. Mild fatty infiltration. Vessels patent. Gb unremarkable. CBD 4mm abd right kidney 10.6cm and spleen 10.2cm.   Dec 31 2018 wbc 5.98 hg 13.3 plat 245.  Did the labs on 8th floor [place] and only cbc back: wbc 5.0 hg 13.2 and plat 238.  neutrophils 1.84 and lymph 2.35.  Covid 19 was this spring/summer and later diagnosed and 4 sick in lab.  Aug 31 2018 u.s liver echotexture within normal limits and no mass lesions and gb normal and pancreas normal and spleen 10.2cm. No hydronephrosis right kidney. vessels patent.  Aug 30 2018 wbc 6.21 hg 13.6 plat 258 and alb 4.5 glu 90 cr 1.01 and k 4.5 and na 141 and ast 26 and alt 17 and tb 0.4. alk 109.   Weight is stable.  Prior she had cut herself down to cell cept 500mg po qd and did not tell us x 5 m  last year and we had wanted her to be on cell cept 250mg po bid but she has been taking it as cell cept 250mg  once a day. Since labs stable staying on this.  She is on the calcium and vit d.  June 2018 labs ast 29 and alt 19 and alk 108 and tb 0.5 and so tolerated it. feb 2018 ast 29 and alt 23 and alk 119.  Full labs Feb 22 2018: alb 4.3, ast 27 and alt 20, ca 9.6,k 4.1, alk 119 cr 0.94, bun 16 glu 118.na 140. wbc 5.95 hg 13.2 plat 249, mcv 94.6.   She says bone density Feb 2019 was normal and due for this next year.  Did mammogram and it was ok.  Nov 2017 labs wbc 7.1 hg 12.8 plat 268 alb 4.4 alk 102, na 140 alt 23 and bun 17 cr 0.94, ast 30 and tb 0.5.   Took her almost 4 plus yr tracy to come off the steroids and so that is why I am not stopping the isp meds. We need to see the lowest dose for her.   Prior had reaction to ursodiol.  Weigth has been up and down.  Plan: 1. Plan for labs in 3 and 6m for stability. 2. Chrck on the u.s today and plan in 6m. 3. Pt lena lwork on weight. 4.  She is aware that if has aih and if stop it all 80% relapse and could starting over with the steroids and issues. Levy is the lowest dose that works.    Stressed to pt the need for social distancing and strict handwashing and wearing a mask and to follow any other new or added CDC recommendations as this is an evolving target.  Duration of visit was 34 minutes with 10 min of chart prep and then 24 min for the face to face visit  with greater than 50% of time spent reviewing chart and events with pt.

## 2022-07-03 ENCOUNTER — P2P PATIENT RECORD (OUTPATIENT)
Age: 57
End: 2022-07-03

## 2022-08-30 ENCOUNTER — ERX REFILL RESPONSE (OUTPATIENT)
Dept: URBAN - METROPOLITAN AREA CLINIC 86 | Facility: CLINIC | Age: 57
End: 2022-08-30

## 2022-08-30 RX ORDER — MYCOPHENOLATE MOFETIL 250 MG/1
TAKE 1 CAPSULE BY MOUTH EVERY DAY CAPSULE ORAL
Qty: 90 CAPSULE | Refills: 0 | OUTPATIENT

## 2022-08-30 RX ORDER — MYCOPHENOLATE MOFETIL 250 MG/1
TAKE 1 CAPSULE BY MOUTH EVERY DAY CAPSULE ORAL
Qty: 90 CAPSULE | Refills: 1 | OUTPATIENT

## 2022-08-31 ENCOUNTER — LAB OUTSIDE AN ENCOUNTER (OUTPATIENT)
Dept: URBAN - METROPOLITAN AREA CLINIC 86 | Facility: CLINIC | Age: 57
End: 2022-08-31

## 2022-11-21 ENCOUNTER — OFFICE VISIT (OUTPATIENT)
Dept: URBAN - METROPOLITAN AREA CLINIC 86 | Facility: CLINIC | Age: 57
End: 2022-11-21

## 2022-11-21 ENCOUNTER — LAB OUTSIDE AN ENCOUNTER (OUTPATIENT)
Dept: URBAN - METROPOLITAN AREA CLINIC 86 | Facility: CLINIC | Age: 57
End: 2022-11-21

## 2022-11-21 RX ORDER — MYCOPHENOLATE MOFETIL 250 MG/1
1 CAPSULES (STABLE ON LOWER DOSE NOW) CAPSULE ORAL ONCE A DAY
Qty: 90 | Refills: 1

## 2022-11-21 NOTE — HPI-TODAY'S VISIT:
This is a scheduled follow-up appointment for this patient, a 57 year old /Black female, after a previous visit May 2022 for an evaluation for elevated liver function tests and due to autoimmune liver disorder and alpha one carrier state.   She is now here. Dear Petra Decker, May 31 ultrasound shows no gallstones. Common bile duct is not dilated at 4 mm.  Liver was homogeneous in echotexture and with no focal abnormality. Imaged pancreas portions were unremarkable. Right kidney was 10.5 cm with no hydronephrosis. Spleen normal at 10.1 cm. Liver vessels patent. Overall the felt it was a negative right upper quadrant sonogram with no focal lesion seen. Good to note.   Dr. Hayes  May 26 labsL ast 23  tb 0.6. alk 94 nd na 142, co2 28 glu 97  alb 4.5 and bun 15 and k 4.4 and cr 0.95 and lipase 41 and alt 11 and cl 105, wbc 6.58 hg 13.4, plat 255.  Cellcept once a day still.  She did the covid 19 vaccine. She went to [place] and got it around [place].  She will consider booster.  October 18 2021 show a white blood cell count of 5.71 hemoglobin 13.6 plate count 257 MCV 97.4.  Neutrophils 2.56 and lymphocytes 2.49. Your total bilirubin is normal at 0.5 and alkaline phosphatase slightly up at 105.  Your AST was 25 and ALT was 18.  Sodium 143 potassium 4.2 CO2 30 which is slightly up.  Glucose elevated at 115 but as you mentioned not fasting.  Albumin normal at 4.6.  BUN normal at 16.  Creatinine 0.95 also normal.  Lipase normal at 36.  Total protein normal at 8.4.  October 19 2021 ultrasound shows echogenicity to be within normal limits and no focal lesions. Common bile duct was 4 mm which is normal. Gallbladder appeared unremarkable. No ascites was seen. Visualized pancreas portions appeared unremarkable. Right kidney 10.6 cm with no hydronephrosis. Liver vessels patent. Overall they felt that you had a normal exam with patent vessels.  March 19 2021 wbc 5.87 and hg 13.9 and palt 272 and mcv 97.5 and neutrophils 2.47 and can't read the cmp panel but able to get other print out and think ast 29 and tb 0.5 and alk 99 and na 139 and co2 23 and glu 99 and alb 4.6 and bun 19 and cr 0.94 and alt 21 and cl 103 and tp 8.1.  Dec 7 2020  labs just arrived. not sure why so delayed. wbc 5.8 hg 13.4 plat 238. mcv 97.  tb 0.4 and alk 111 and ast 31 and alt 27 and ideal alt less than25. na 141 and k 4.7 and cl 102 and co2 23 and bun 15 and cr 0.94. glu 89. Lipase 39 normal.  ast 25 and alt 17 prior so little higher.   Sept 2020: Labs back ast 29 and tb 0.6 an dalk 100 and na 141 and glu 95 and bun 16 and cr 0.96 and lipase 40 and alt 21 and tp 7.7 wbc6.06 hg 13.6 plat 260 and mcbv 94.7 so stay same dose.  Covid 19 was earlier in 2020.  7-16-20: wbc 6.17 hg 13.4 plat 246. Bun 14 and co2 29 and na 141, ca 10.0, cr 0.9 lipase 42. Tb 0.4 and ast 25 and alk 106 and alt 17.   Prior alk 106 and ast 25 again.   Wbc 6.0 hg 13.6 plat 256. Alb 4.7 and bun 17 and co2 27 and glu 101 and na 140 and alt 19 and ca 10.0, cr 0.9 and k 4.3 and alk 106 slightly up for lab, ast 25 and tb 0.5.  Sept 2020 u.s u.s patent vessels and no suspicious liver lesion. No discrete liver leison. No stones and no duct dilaiton and duct 4,4mm and pancreas normal and spleen 10.8cm and vessels open.  feb Prelim:  Areas to be Scanned:  Gallbladder: wnl.  Bile ducts: 4mm, wnl.  Liver: mild increased echoes of the boone.  Pancreas: wnl.  Spleen: 10.4 x 3.8cm, wnl.  Right Kidney: 10.1 x 3.8 x 5.9cm, wnl.  Other: TEXT   Impression: mpv w/ hepatopetal flow, rt & lt pv, hep art, ivc, hep v's, spl art & v are patent. no masses or fluid vis.   Feb 13 2020 wbc 6.6 hg 13.6 plat 272 and alb 4.4 and tb 0.6 and glu 95 and cl 105 and alk 99 and ast 26 and alt na 140 and alt 22 and co2 28 and cr 0.92 and tp 7.9. k 4.3.  Has only been on the meds once a day for the last 6m and she is on this dose and labs stable stay on the dose.  March 21 2019 na 141 and k 4.3 and cl 106 and co2 29 and glu 94 and cr 0.85 and alb 4.2 and tb 0.5 and ca 9.1 and ast 27 and alt 17 and alk 94.  March 2019 liver somewhat echogenic. Mild fatty infiltration. Vessels patent. Gb unremarkable. CBD 4mm abd right kidney 10.6cm and spleen 10.2cm.   Dec 31 2018 wbc 5.98 hg 13.3 plat 245.  Did the labs on 8th floor [place] and only cbc back: wbc 5.0 hg 13.2 and plat 238.  neutrophils 1.84 and lymph 2.35.  Covid 19 was this spring/summer and later diagnosed and 4 sick in lab.  Aug 31 2018 u.s liver echotexture within normal limits and no mass lesions and gb normal and pancreas normal and spleen 10.2cm. No hydronephrosis right kidney. vessels patent.  Aug 30 2018 wbc 6.21 hg 13.6 plat 258 and alb 4.5 glu 90 cr 1.01 and k 4.5 and na 141 and ast 26 and alt 17 and tb 0.4. alk 109.   Weight is stable.  Prior she had cut herself down to cell cept 500mg po qd and did not tell us x 5 m  last year and we had wanted her to be on cell cept 250mg po bid but she has been taking it as cell cept 250mg  once a day. Since labs stable staying on this.  She is on the calcium and vit d.  June 2018 labs ast 29 and alt 19 and alk 108 and tb 0.5 and so tolerated it. feb 2018 ast 29 and alt 23 and alk 119.  Full labs Feb 22 2018: alb 4.3, ast 27 and alt 20, ca 9.6,k 4.1, alk 119 cr 0.94, bun 16 glu 118.na 140. wbc 5.95 hg 13.2 plat 249, mcv 94.6.   She says bone density Feb 2019 was normal and due for this next year.  Did mammogram and it was ok.  Nov 2017 labs wbc 7.1 hg 12.8 plat 268 alb 4.4 alk 102, na 140 alt 23 and bun 17 cr 0.94, ast 30 and tb 0.5.   Took her almost 4 plus yr tracy to come off the steroids and so that is why I am not stopping the isp meds. We need to see the lowest dose for her.   Prior had reaction to ursodiol.  Weigth has been up and down.  Plan: 1. Plan for labs in 3 and 6m for stability. 2. Chrck on the u.s today and plan in 6m. 3. Pt lena spencer on weight. 4.  She is aware that if has aih and if stop it all 80% relapse and could starting over with the steroids and issues. Levy is the lowest dose that works.    Stressed to pt the need for social distancing and strict handwashing and wearing a mask and to follow any other new or added CDC recommendations as this is an evolving target.  Duration of visit was  minutes with 10 min of chart prep and then 20 min for the face to face visit  with greater than 50% of time spent reviewing chart and events with pt.

## 2022-11-30 ENCOUNTER — LAB OUTSIDE AN ENCOUNTER (OUTPATIENT)
Dept: URBAN - METROPOLITAN AREA CLINIC 86 | Facility: CLINIC | Age: 57
End: 2022-11-30

## 2022-12-22 ENCOUNTER — CLAIMS CREATED FROM THE CLAIM WINDOW (OUTPATIENT)
Dept: URBAN - METROPOLITAN AREA CLINIC 86 | Facility: CLINIC | Age: 57
End: 2022-12-22
Payer: COMMERCIAL

## 2022-12-22 ENCOUNTER — WEB ENCOUNTER (OUTPATIENT)
Dept: URBAN - METROPOLITAN AREA CLINIC 86 | Facility: CLINIC | Age: 57
End: 2022-12-22

## 2022-12-22 ENCOUNTER — OFFICE VISIT (OUTPATIENT)
Dept: URBAN - METROPOLITAN AREA CLINIC 91 | Facility: CLINIC | Age: 57
End: 2022-12-22
Payer: COMMERCIAL

## 2022-12-22 VITALS
DIASTOLIC BLOOD PRESSURE: 87 MMHG | BODY MASS INDEX: 34.53 KG/M2 | SYSTOLIC BLOOD PRESSURE: 141 MMHG | HEART RATE: 73 BPM | HEIGHT: 67 IN | TEMPERATURE: 97.8 F | WEIGHT: 220 LBS

## 2022-12-22 DIAGNOSIS — Z71.89 VACCINE COUNSELING: ICD-10-CM

## 2022-12-22 DIAGNOSIS — I10 ESSENTIAL HYPERTENSION: ICD-10-CM

## 2022-12-22 DIAGNOSIS — Z79.899 HIGH RISK MEDICATION USE: ICD-10-CM

## 2022-12-22 DIAGNOSIS — Z12.11 COLON CANCER SCREENING: ICD-10-CM

## 2022-12-22 DIAGNOSIS — E61.1 IRON DEFICIENCY: ICD-10-CM

## 2022-12-22 DIAGNOSIS — Z14.8 ALPHA-1-ANTITRYPSIN DEFICIENCY CARRIER: ICD-10-CM

## 2022-12-22 DIAGNOSIS — D57.3 SICKLE CELL TRAIT: ICD-10-CM

## 2022-12-22 DIAGNOSIS — K21.9 GASTROESOPHAGEAL REFLUX DISEASE WITHOUT ESOPHAGITIS: ICD-10-CM

## 2022-12-22 DIAGNOSIS — E66.9 OBESITY (BMI 30-39.9): ICD-10-CM

## 2022-12-22 DIAGNOSIS — K75.4 AUTOIMMUNE HEPATITIS: ICD-10-CM

## 2022-12-22 DIAGNOSIS — Z98.890 HISTORY OF COLONOSCOPY: ICD-10-CM

## 2022-12-22 PROBLEM — 59621000: Status: ACTIVE | Noted: 2020-09-19

## 2022-12-22 PROBLEM — 35240004: Status: ACTIVE | Noted: 2020-09-19

## 2022-12-22 PROBLEM — 453861000124107: Status: ACTIVE | Noted: 2020-09-19

## 2022-12-22 PROBLEM — 851000119109: Status: ACTIVE | Noted: 2020-09-19

## 2022-12-22 PROBLEM — 266435005: Status: ACTIVE | Noted: 2020-09-19

## 2022-12-22 PROCEDURE — 93975 VASCULAR STUDY: CPT

## 2022-12-22 PROCEDURE — 99214 OFFICE O/P EST MOD 30 MIN: CPT | Performed by: PHYSICIAN ASSISTANT

## 2022-12-22 PROCEDURE — 76705 ECHO EXAM OF ABDOMEN: CPT

## 2022-12-22 PROCEDURE — 99214 OFFICE O/P EST MOD 30 MIN: CPT

## 2022-12-22 RX ORDER — MYCOPHENOLATE MOFETIL 250 MG/1
1 CAPSULES (STABLE ON LOWER DOSE NOW) CAPSULE ORAL ONCE A DAY
Qty: 90 | Refills: 1 | Status: ACTIVE | COMMUNITY

## 2022-12-22 RX ORDER — UBIDECARENONE/VIT E ACET 100MG-5
1 CAPSULE CAPSULE ORAL ONCE A DAY
Status: ACTIVE | COMMUNITY

## 2022-12-22 RX ORDER — MYCOPHENOLATE MOFETIL 250 MG/1
TAKE 1 CAPSULE BY MOUTH EVERY DAY CAPSULE ORAL
Qty: 90 CAPSULE | Refills: 0 | Status: ACTIVE | COMMUNITY

## 2022-12-22 RX ORDER — DOCUSATE SODIUM 100 MG/1
1 CAPSULE AS NEEDED CAPSULE ORAL ONCE A DAY
Status: ACTIVE | COMMUNITY

## 2022-12-22 RX ORDER — MYCOPHENOLATE MOFETIL 250 MG/1
1 CAPSULES (STABLE ON LOWER DOSE NOW) CAPSULE ORAL ONCE A DAY
Qty: 90 | Refills: 1

## 2022-12-22 NOTE — HPI-TODAY'S VISIT:
This is a scheduled follow-up appointment for this patient, a 57 year old /Black female,2for an evaluation for elevated liver function tests and due to autoimmune liver disorder and alpha one carrier state.   12/22/22 visit US done today and prelim report with no abnormal findings so this is good to see. her labs have been stable and at goal most recently bili 0.5, alt 32, alk phos ast 34, alp 20 cbc weight is 220 is walking daily  cell cept daily but has missed some doses so maybe why the labs are higher than in may and discussed the compliance due for dexa given a[facility]    recap May 31 ultrasound shows no gallstones. Common bile duct is not dilated at 4 mm.  Liver was homogeneous in echotexture and with no focal abnormality. Imaged pancreas portions were unremarkable. Right kidney was 10.5 cm with no hydronephrosis. Spleen normal at 10.1 cm. Liver vessels patent. Overall the felt it was a negative right upper quadrant sonogram with no focal lesion seen.   May 26 labsL ast 23  tb 0.6. alk 94 nd na 142, co2 28 glu 97  alb 4.5 and bun 15 and k 4.4 and cr 0.95 and lipase 41 and alt 11 and cl 105, wbc 6.58 hg 13.4, plat 255.  Cellcept once a day still.  She did the covid 19 vaccine. She went to [place] and got it around [place].  She will consider booster.  October 18 2021 show a white blood cell count of 5.71 hemoglobin 13.6 plate count 257 MCV 97.4.  Neutrophils 2.56 and lymphocytes 2.49. Your total bilirubin is normal at 0.5 and alkaline phosphatase slightly up at 105.  Your AST was 25 and ALT was 18.  Sodium 143 potassium 4.2 CO2 30 which is slightly up.  Glucose elevated at 115 but as you mentioned not fasting.  Albumin normal at 4.6.  BUN normal at 16.  Creatinine 0.95 also normal.  Lipase normal at 36.  Total protein normal at 8.4.  October 19 2021 ultrasound shows echogenicity to be within normal limits and no focal lesions. Common bile duct was 4 mm which is normal. Gallbladder appeared unremarkable. No ascites was seen. Visualized pancreas portions appeared unremarkable. Right kidney 10.6 cm with no hydronephrosis. Liver vessels patent. Overall they felt that you had a normal exam with patent vessels.  March 19 2021 wbc 5.87 and hg 13.9 and palt 272 and mcv 97.5 and neutrophils 2.47 and can't read the cmp panel but able to get other print out and think ast 29 and tb 0.5 and alk 99 and na 139 and co2 23 and glu 99 and alb 4.6 and bun 19 and cr 0.94 and alt 21 and cl 103 and tp 8.1.  Dec 7 2020  labs just arrived. not sure why so delayed. wbc 5.8 hg 13.4 plat 238. mcv 97.  tb 0.4 and alk 111 and ast 31 and alt 27 and ideal alt less than25. na 141 and k 4.7 and cl 102 and co2 23 and bun 15 and cr 0.94. glu 89. Lipase 39 normal.  ast 25 and alt 17 prior so little higher.   Sept 2020: Labs back ast 29 and tb 0.6 an dalk 100 and na 141 and glu 95 and bun 16 and cr 0.96 and lipase 40 and alt 21 and tp 7.7 wbc6.06 hg 13.6 plat 260 and mcbv 94.7 so stay same dose.  Covid 19 was earlier in 2020.  7-16-20: wbc 6.17 hg 13.4 plat 246. Bun 14 and co2 29 and na 141, ca 10.0, cr 0.9 lipase 42. Tb 0.4 and ast 25 and alk 106 and alt 17.   Prior alk 106 and ast 25 again.   Wbc 6.0 hg 13.6 plat 256. Alb 4.7 and bun 17 and co2 27 and glu 101 and na 140 and alt 19 and ca 10.0, cr 0.9 and k 4.3 and alk 106 slightly up for lab, ast 25 and tb 0.5.  Sept 2020 u.s u.s patent vessels and no suspicious liver lesion. No discrete liver leison. No stones and no duct dilaiton and duct 4,4mm and pancreas normal and spleen 10.8cm and vessels open.  feb Prelim:  Areas to be Scanned:  Gallbladder: wnl.  Bile ducts: 4mm, wnl.  Liver: mild increased echoes of the boone.  Pancreas: wnl.  Spleen: 10.4 x 3.8cm, wnl.  Right Kidney: 10.1 x 3.8 x 5.9cm, wnl.  Other: TEXT   Impression: mpv w/ hepatopetal flow, rt & lt pv, hep art, ivc, hep v's, spl art & v are patent. no masses or fluid vis.   Feb 13 2020 wbc 6.6 hg 13.6 plat 272 and alb 4.4 and tb 0.6 and glu 95 and cl 105 and alk 99 and ast 26 and alt na 140 and alt 22 and co2 28 and cr 0.92 and tp 7.9. k 4.3.  Has only been on the meds once a day for the last 6m and she is on this dose and labs stable stay on the dose.  March 21 2019 na 141 and k 4.3 and cl 106 and co2 29 and glu 94 and cr 0.85 and alb 4.2 and tb 0.5 and ca 9.1 and ast 27 and alt 17 and alk 94.  March 2019 liver somewhat echogenic. Mild fatty infiltration. Vessels patent. Gb unremarkable. CBD 4mm abd right kidney 10.6cm and spleen 10.2cm.   Dec 31 2018 wbc 5.98 hg 13.3 plat 245.  Did the labs on 8th floor [place] and only cbc back: wbc 5.0 hg 13.2 and plat 238.  neutrophils 1.84 and lymph 2.35.  Covid 19 was this spring/summer and later diagnosed and 4 sick in lab.  Aug 31 2018 u.s liver echotexture within normal limits and no mass lesions and gb normal and pancreas normal and spleen 10.2cm. No hydronephrosis right kidney. vessels patent.  Aug 30 2018 wbc 6.21 hg 13.6 plat 258 and alb 4.5 glu 90 cr 1.01 and k 4.5 and na 141 and ast 26 and alt 17 and tb 0.4. alk 109.   Weight is stable.  Prior she had cut herself down to cell cept 500mg po qd and did not tell us x 5 m  last year and we had wanted her to be on cell cept 250mg po bid but she has been taking it as cell cept 250mg  once a day. Since labs stable staying on this.  She is on the calcium and vit d.  June 2018 labs ast 29 and alt 19 and alk 108 and tb 0.5 and so tolerated it. feb 2018 ast 29 and alt 23 and alk 119.  Full labs Feb 22 2018: alb 4.3, ast 27 and alt 20, ca 9.6,k 4.1, alk 119 cr 0.94, bun 16 glu 118.na 140. wbc 5.95 hg 13.2 plat 249, mcv 94.6.   Prior had reaction to ursodiol.

## 2022-12-27 ENCOUNTER — WEB ENCOUNTER (OUTPATIENT)
Dept: URBAN - METROPOLITAN AREA CLINIC 86 | Facility: CLINIC | Age: 57
End: 2022-12-27

## 2022-12-27 PROBLEM — 47461006: Status: ACTIVE | Noted: 2020-09-19

## 2023-03-13 ENCOUNTER — ERX REFILL RESPONSE (OUTPATIENT)
Dept: URBAN - METROPOLITAN AREA CLINIC 86 | Facility: CLINIC | Age: 58
End: 2023-03-13

## 2023-03-13 RX ORDER — MYCOPHENOLATE MOFETIL 250 MG/1
TAKE 1 CAPSULE BY MOUTH EVERY DAY CAPSULE ORAL
Qty: 90 CAPSULE | Refills: 0 | OUTPATIENT

## 2023-03-22 ENCOUNTER — LAB OUTSIDE AN ENCOUNTER (OUTPATIENT)
Dept: URBAN - METROPOLITAN AREA CLINIC 86 | Facility: CLINIC | Age: 58
End: 2023-03-22

## 2023-06-09 ENCOUNTER — TELEPHONE ENCOUNTER (OUTPATIENT)
Dept: URBAN - METROPOLITAN AREA CLINIC 86 | Facility: CLINIC | Age: 58
End: 2023-06-09

## 2023-06-09 RX ORDER — MYCOPHENOLATE MOFETIL 250 MG/1
TAKE 1 CAPSULE BY MOUTH EVERY DAY CAPSULE ORAL
Qty: 90 CAPSULE | Refills: 0

## 2023-06-22 ENCOUNTER — LAB OUTSIDE AN ENCOUNTER (OUTPATIENT)
Dept: URBAN - METROPOLITAN AREA CLINIC 86 | Facility: CLINIC | Age: 58
End: 2023-06-22

## 2023-07-11 ENCOUNTER — OFFICE VISIT (OUTPATIENT)
Dept: URBAN - METROPOLITAN AREA CLINIC 97 | Facility: CLINIC | Age: 58
End: 2023-07-11
Payer: COMMERCIAL

## 2023-07-11 ENCOUNTER — TELEPHONE ENCOUNTER (OUTPATIENT)
Dept: URBAN - METROPOLITAN AREA CLINIC 86 | Facility: CLINIC | Age: 58
End: 2023-07-11

## 2023-07-11 DIAGNOSIS — Z14.8 ALPHA-1-ANTITRYPSIN DEFICIENCY CARRIER: ICD-10-CM

## 2023-07-11 PROCEDURE — 93975 VASCULAR STUDY: CPT

## 2023-07-11 PROCEDURE — 76705 ECHO EXAM OF ABDOMEN: CPT

## 2023-07-11 NOTE — HPI-TODAY'S VISIT:
Dear Petra Decker,  The July 11, 2023 ultrasound was sent to me.  The liver is homogeneous in echotexture and normal in echogenicity.  No focal lesions.  The gallbladder is thin-walled without any issues.  The common duct is 5 mm and this is normal.  Right kidney 10.3 cm and the left kidney 11 cm and they appear normal.  Spleen normal 8.8 cm.  The hepatic vasculature is patent.  Overall they did not see any signs of cirrhosis or any masses.  The hepatic vasculature was patent.  We will review this at the follow-up.  Nina Gonzáles PA-C

## 2023-07-13 ENCOUNTER — TELEPHONE ENCOUNTER (OUTPATIENT)
Dept: URBAN - METROPOLITAN AREA CLINIC 86 | Facility: CLINIC | Age: 58
End: 2023-07-13

## 2023-07-13 NOTE — HPI-TODAY'S VISIT:
Dear Petra Decker, July 2023 labs sent in showed AST 31 ALT 27 alk phos 88 and total bilirubin 0.6. Sodium 140 potassium 4.1 BUN 17 creatinine 0.99 lipase 36 total protein 8.0 chloride 105. WBC 6.39 hemoglobin 13.7 platelet count 250 MCV 96 neutrophils 2.88 and lymphocytes 2.75. Last year your AST was running about 23 and ALT of 11 so while these labs are in the normal range they are little higher. Any recent illness or changes that could explain why the AST and ALT are running a little bit higher? Please let us know. Dr. Hayes

## 2023-07-25 ENCOUNTER — OFFICE VISIT (OUTPATIENT)
Dept: URBAN - METROPOLITAN AREA TELEHEALTH 2 | Facility: TELEHEALTH | Age: 58
End: 2023-07-25
Payer: COMMERCIAL

## 2023-07-25 VITALS — HEIGHT: 67 IN | WEIGHT: 220 LBS | BODY MASS INDEX: 34.53 KG/M2

## 2023-07-25 DIAGNOSIS — K75.4 AUTOIMMUNE HEPATITIS: ICD-10-CM

## 2023-07-25 DIAGNOSIS — E61.1 IRON DEFICIENCY: ICD-10-CM

## 2023-07-25 DIAGNOSIS — K21.9 GASTROESOPHAGEAL REFLUX DISEASE WITHOUT ESOPHAGITIS: ICD-10-CM

## 2023-07-25 DIAGNOSIS — Z12.11 COLON CANCER SCREENING: ICD-10-CM

## 2023-07-25 DIAGNOSIS — Z79.899 HIGH RISK MEDICATION USE: ICD-10-CM

## 2023-07-25 DIAGNOSIS — Z98.890 HISTORY OF COLONOSCOPY: ICD-10-CM

## 2023-07-25 DIAGNOSIS — I10 ESSENTIAL HYPERTENSION: ICD-10-CM

## 2023-07-25 DIAGNOSIS — Z71.89 VACCINE COUNSELING: ICD-10-CM

## 2023-07-25 DIAGNOSIS — Z14.8 ALPHA-1-ANTITRYPSIN DEFICIENCY CARRIER: ICD-10-CM

## 2023-07-25 DIAGNOSIS — D57.3 SICKLE CELL TRAIT: ICD-10-CM

## 2023-07-25 DIAGNOSIS — E66.8 MODERATE OBESITY: ICD-10-CM

## 2023-07-25 PROCEDURE — 99214 OFFICE O/P EST MOD 30 MIN: CPT

## 2023-07-25 RX ORDER — UBIDECARENONE/VIT E ACET 100MG-5
1 CAPSULE CAPSULE ORAL ONCE A DAY
Status: ACTIVE | COMMUNITY

## 2023-07-25 RX ORDER — MYCOPHENOLATE MOFETIL 250 MG/1
1 CAPSULES (STABLE ON LOWER DOSE NOW) CAPSULE ORAL ONCE A DAY
Qty: 90 | Refills: 1

## 2023-07-25 RX ORDER — MYCOPHENOLATE MOFETIL 250 MG/1
TAKE 1 CAPSULE BY MOUTH EVERY DAY CAPSULE ORAL
Qty: 90 CAPSULE | Refills: 0 | Status: ACTIVE | COMMUNITY

## 2023-07-25 RX ORDER — MYCOPHENOLATE MOFETIL 250 MG/1
1 CAPSULES (STABLE ON LOWER DOSE NOW) CAPSULE ORAL ONCE A DAY
Qty: 90 | Refills: 1 | Status: ACTIVE | COMMUNITY

## 2023-07-25 RX ORDER — DOCUSATE SODIUM 100 MG/1
1 CAPSULE AS NEEDED CAPSULE ORAL ONCE A DAY
Status: ACTIVE | COMMUNITY

## 2023-07-25 NOTE — HPI-TODAY'S VISIT:
This is a scheduled follow-up appointment for this patient, a 57 year old /Black female,2for an evaluation for elevated liver function tests and due to autoimmune liver disorder and alpha one carrier state.    7/25/23 telemed July 2023 labs sent in showed AST 31 ALT 27 alk phos 88 and total bilirubin 0.6. Sodium 140 potassium 4.1 BUN 17 creatinine 0.99 lipase 36 total protein 8.0 chloride 105. WBC 6.39 hemoglobin 13.7 platelet count 250 MCV 96 neutrophils 2.88 and lymphocytes 2.75. Last year your AST was running about 23 and ALT of 11 so while these labs are in the normal range they are little higher. Any recent illness or changes that could explain why the AST and ALT are running a little bit higher? Please let us know.  The July 11, 2023 ultrasound was sent to me.  The liver is homogeneous in echotexture and normal in echogenicity.  No focal lesions.  The gallbladder is thin-walled without any issues.  The common duct is 5 mm and this is normal.  Right kidney 10.3 cm and the left kidney 11 cm and they appear normal.  Spleen normal 8.8 cm.  The hepatic vasculature is patent.  Overall they did not see any signs of cirrhosis or any masses.  The hepatic vasculature was patent.  We will review this at the follow-up.  she notes she has been fogetting  a good bit to take the medicine but now doing better since she got the letter.  she has been exercising but weight stable she has not started any new medications  recap 12/22/22 visit US done today and prelim report with no abnormal findings so this is good to see. her labs have been stable and at goal most recently bili 0.5, alt 32, alk phos ast 34, alp 20 cbc weight is 220 is walking daily  cell cept daily but has missed some doses so maybe why the labs are higher than in may and discussed the compliance due for dexa given [facility]    recap May 31 ultrasound shows no gallstones. Common bile duct is not dilated at 4 mm.  Liver was homogeneous in echotexture and with no focal abnormality. Imaged pancreas portions were unremarkable. Right kidney was 10.5 cm with no hydronephrosis. Spleen normal at 10.1 cm. Liver vessels patent. Overall the felt it was a negative right upper quadrant sonogram with no focal lesion seen.   May 26 labsL ast 23  tb 0.6. alk 94 nd na 142, co2 28 glu 97  alb 4.5 and bun 15 and k 4.4 and cr 0.95 and lipase 41 and alt 11 and cl 105, wbc 6.58 hg 13.4, plat 255.  Cellcept once a day still.  She did the covid 19 vaccine. She went to [place] and got it around [place].  She will consider booster.  October 18 2021 show a white blood cell count of 5.71 hemoglobin 13.6 plate count 257 MCV 97.4.  Neutrophils 2.56 and lymphocytes 2.49. Your total bilirubin is normal at 0.5 and alkaline phosphatase slightly up at 105.  Your AST was 25 and ALT was 18.  Sodium 143 potassium 4.2 CO2 30 which is slightly up.  Glucose elevated at 115 but as you mentioned not fasting.  Albumin normal at 4.6.  BUN normal at 16.  Creatinine 0.95 also normal.  Lipase normal at 36.  Total protein normal at 8.4.  October 19 2021 ultrasound shows echogenicity to be within normal limits and no focal lesions. Common bile duct was 4 mm which is normal. Gallbladder appeared unremarkable. No ascites was seen. Visualized pancreas portions appeared unremarkable. Right kidney 10.6 cm with no hydronephrosis. Liver vessels patent. Overall they felt that you had a normal exam with patent vessels.  March 19 2021 wbc 5.87 and hg 13.9 and palt 272 and mcv 97.5 and neutrophils 2.47 and can't read the cmp panel but able to get other print out and think ast 29 and tb 0.5 and alk 99 and na 139 and co2 23 and glu 99 and alb 4.6 and bun 19 and cr 0.94 and alt 21 and cl 103 and tp 8.1.  Dec 7 2020  labs just arrived. not sure why so delayed. wbc 5.8 hg 13.4 plat 238. mcv 97.  tb 0.4 and alk 111 and ast 31 and alt 27 and ideal alt less than25. na 141 and k 4.7 and cl 102 and co2 23 and bun 15 and cr 0.94. glu 89. Lipase 39 normal.  ast 25 and alt 17 prior so little higher.   Sept 2020: Labs back ast 29 and tb 0.6 an dalk 100 and na 141 and glu 95 and bun 16 and cr 0.96 and lipase 40 and alt 21 and tp 7.7 wbc6.06 hg 13.6 plat 260 and mcbv 94.7 so stay same dose.  Covid 19 was earlier in 2020.  7-16-20: wbc 6.17 hg 13.4 plat 246. Bun 14 and co2 29 and na 141, ca 10.0, cr 0.9 lipase 42. Tb 0.4 and ast 25 and alk 106 and alt 17.   Prior alk 106 and ast 25 again.   Wbc 6.0 hg 13.6 plat 256. Alb 4.7 and bun 17 and co2 27 and glu 101 and na 140 and alt 19 and ca 10.0, cr 0.9 and k 4.3 and alk 106 slightly up for lab, ast 25 and tb 0.5.  Sept 2020 u.s u.s patent vessels and no suspicious liver lesion. No discrete liver leison. No stones and no duct dilaiton and duct 4,4mm and pancreas normal and spleen 10.8cm and vessels open.  feb Prelim:  Areas to be Scanned:  Gallbladder: wnl.  Bile ducts: 4mm, wnl.  Liver: mild increased echoes of the boone.  Pancreas: wnl.  Spleen: 10.4 x 3.8cm, wnl.  Right Kidney: 10.1 x 3.8 x 5.9cm, wnl.  Other: TEXT   Impression: mpv w/ hepatopetal flow, rt & lt pv, hep art, ivc, hep v's, spl art & v are patent. no masses or fluid vis.   Feb 13 2020 wbc 6.6 hg 13.6 plat 272 and alb 4.4 and tb 0.6 and glu 95 and cl 105 and alk 99 and ast 26 and alt na 140 and alt 22 and co2 28 and cr 0.92 and tp 7.9. k 4.3.  Has only been on the meds once a day for the last 6m and she is on this dose and labs stable stay on the dose.  March 21 2019 na 141 and k 4.3 and cl 106 and co2 29 and glu 94 and cr 0.85 and alb 4.2 and tb 0.5 and ca 9.1 and ast 27 and alt 17 and alk 94.  March 2019 liver somewhat echogenic. Mild fatty infiltration. Vessels patent. Gb unremarkable. CBD 4mm abd right kidney 10.6cm and spleen 10.2cm.   Dec 31 2018 wbc 5.98 hg 13.3 plat 245.  Did the labs on [facility] [facility] and only cbc back: wbc 5.0 hg 13.2 and plat 238.  neutrophils 1.84 and lymph 2.35.  Covid 19 was this spring/summer and later diagnosed and 4 sick in lab.  Aug 31 2018 u.s liver echotexture within normal limits and no mass lesions and gb normal and pancreas normal and spleen 10.2cm. No hydronephrosis right kidney. vessels patent.  Aug 30 2018 wbc 6.21 hg 13.6 plat 258 and alb 4.5 glu 90 cr 1.01 and k 4.5 and na 141 and ast 26 and alt 17 and tb 0.4. alk 109.   Weight is stable.  Prior she had cut herself down to cell cept 500mg po qd and did not tell us x 5 m  last year and we had wanted her to be on cell cept 250mg po bid but she has been taking it as cell cept 250mg  once a day. Since labs stable staying on this.  She is on the calcium and vit d.  June 2018 labs ast 29 and alt 19 and alk 108 and tb 0.5 and so tolerated it. feb 2018 ast 29 and alt 23 and alk 119.  Full labs Feb 22 2018: alb 4.3, ast 27 and alt 20, ca 9.6,k 4.1, alk 119 cr 0.94, bun 16 glu 118.na 140. wbc 5.95 hg 13.2 plat 249, mcv 94.6.   Prior had reaction to ursodiol.

## 2023-08-08 ENCOUNTER — LAB OUTSIDE AN ENCOUNTER (OUTPATIENT)
Dept: URBAN - METROPOLITAN AREA TELEHEALTH 2 | Facility: TELEHEALTH | Age: 58
End: 2023-08-08

## 2023-08-23 ENCOUNTER — TELEPHONE ENCOUNTER (OUTPATIENT)
Dept: URBAN - METROPOLITAN AREA CLINIC 86 | Facility: CLINIC | Age: 58
End: 2023-08-23

## 2023-08-23 NOTE — HPI-TODAY'S VISIT:
Dear Petra Decker, They sent a note from [facility] dated July 28, 2023 reminding us to remind you that with your alpha-1 antitrypsin deficiency you should be encouraged to do pulmonary follow-up annually.  I think they may be talking more so about the full disease rather than the carrier state as you have but it has been a while since you have been seen for your alpha-1 MZ and I saw Nina and you discussed this also. You previously saw Dr. Yoav Haywood in the [place] pulmonary division and we would recommend that you see him again as typically for carriers have seen them recommend to see you every 5 yrs depending on course. Thank you. Dr. Hayes

## 2023-10-25 ENCOUNTER — LAB OUTSIDE AN ENCOUNTER (OUTPATIENT)
Dept: URBAN - METROPOLITAN AREA TELEHEALTH 2 | Facility: TELEHEALTH | Age: 58
End: 2023-10-25

## 2024-01-01 ENCOUNTER — LAB OUTSIDE AN ENCOUNTER (OUTPATIENT)
Dept: URBAN - METROPOLITAN AREA TELEHEALTH 2 | Facility: TELEHEALTH | Age: 59
End: 2024-01-01

## 2024-01-16 ENCOUNTER — LAB OUTSIDE AN ENCOUNTER (OUTPATIENT)
Dept: URBAN - METROPOLITAN AREA TELEHEALTH 2 | Facility: TELEHEALTH | Age: 59
End: 2024-01-16

## 2024-04-25 ENCOUNTER — US W/ DOPP (OUTPATIENT)
Dept: URBAN - METROPOLITAN AREA CLINIC 91 | Facility: CLINIC | Age: 59
End: 2024-04-25
Payer: COMMERCIAL

## 2024-04-25 ENCOUNTER — OV EP (OUTPATIENT)
Dept: URBAN - METROPOLITAN AREA CLINIC 86 | Facility: CLINIC | Age: 59
End: 2024-04-25
Payer: COMMERCIAL

## 2024-04-25 VITALS
DIASTOLIC BLOOD PRESSURE: 90 MMHG | TEMPERATURE: 96.8 F | SYSTOLIC BLOOD PRESSURE: 161 MMHG | HEIGHT: 67 IN | BODY MASS INDEX: 35 KG/M2 | WEIGHT: 223 LBS | HEART RATE: 62 BPM

## 2024-04-25 DIAGNOSIS — Z12.11 COLON CANCER SCREENING: ICD-10-CM

## 2024-04-25 DIAGNOSIS — E66.9 OBESITY (BMI 30-39.9): ICD-10-CM

## 2024-04-25 DIAGNOSIS — Z14.8 ALPHA-1-ANTITRYPSIN DEFICIENCY CARRIER: ICD-10-CM

## 2024-04-25 DIAGNOSIS — D57.3 SICKLE CELL TRAIT: ICD-10-CM

## 2024-04-25 DIAGNOSIS — I10 ESSENTIAL HYPERTENSION: ICD-10-CM

## 2024-04-25 DIAGNOSIS — K75.4 AUTOIMMUNE HEPATITIS: ICD-10-CM

## 2024-04-25 DIAGNOSIS — K21.9 GASTROESOPHAGEAL REFLUX DISEASE WITHOUT ESOPHAGITIS: ICD-10-CM

## 2024-04-25 DIAGNOSIS — Z98.890 HISTORY OF COLONOSCOPY: ICD-10-CM

## 2024-04-25 DIAGNOSIS — Z79.899 HIGH RISK MEDICATION USE: ICD-10-CM

## 2024-04-25 DIAGNOSIS — Z71.89 VACCINE COUNSELING: ICD-10-CM

## 2024-04-25 DIAGNOSIS — E61.1 IRON DEFICIENCY: ICD-10-CM

## 2024-04-25 PROCEDURE — 76705 ECHO EXAM OF ABDOMEN: CPT

## 2024-04-25 PROCEDURE — 99215 OFFICE O/P EST HI 40 MIN: CPT

## 2024-04-25 PROCEDURE — 93975 VASCULAR STUDY: CPT

## 2024-04-25 RX ORDER — DOCUSATE SODIUM 100 MG/1
1 CAPSULE AS NEEDED CAPSULE ORAL ONCE A DAY
Status: ACTIVE | COMMUNITY

## 2024-04-25 RX ORDER — MYCOPHENOLATE MOFETIL 250 MG/1
1 CAPSULES (STABLE ON LOWER DOSE NOW) CAPSULE ORAL ONCE A DAY
Qty: 90 | Refills: 1

## 2024-04-25 RX ORDER — MYCOPHENOLATE MOFETIL 250 MG/1
TAKE 1 CAPSULE BY MOUTH EVERY DAY CAPSULE ORAL
Qty: 90 CAPSULE | Refills: 0 | Status: ACTIVE | COMMUNITY

## 2024-04-25 RX ORDER — UBIDECARENONE/VIT E ACET 100MG-5
1 CAPSULE CAPSULE ORAL ONCE A DAY
Status: ACTIVE | COMMUNITY

## 2024-04-25 NOTE — HPI-TODAY'S VISIT:
Pt is a 58 year old /Black female, seen July 2023 last by Nina for an evaluation for elevated liver function tests and due to autoimmune liver disorder and alpha one carrier state.   April 22 labs showed lipase normal at 38.Glucose was 101 slightly up and unclear if you were fasting? BUN of 13 creatinine 0.9 sodium 139 potassium 4.5 calcium 9.3 albumin 4.4 bilirubin 0.4 alk phos 104 AST 29 ALT 24 with ideal ALT less than 25.Direct bilirubin was normal at 0.1.WBC 6.4 hemoglobin 13.7 platelet count 264 and these are normal. Your MCV was slightly up at 98 and your RDW was slightly low 11.3 and please share with primary provider to assess and compare to recent labs.Neutrophils normal at 3.2 and lymphocytes normal at 2.5.It appears that you have an appointment coming up on April 25 and we will discuss this further with you then.  She has been cuttingback on meat and so that could be issue.  Taking geritol.  Mon told mod sleep apnea and to get cpap vs mouth piece.  She says being fit for this.  She did u.s now.   Nina  [facility] July 28, 2023 reminding us to remind you that with your alpha-1 antitrypsin deficiency you should be encouraged to do pulmonary follow-up annually. I think they may be talking more so about the full disease rather than the carrier state as you have but it has been a while since you have been seen for your alpha-1 MZ and I saw Nina and you discussed this also. You previously saw Dr. Yoav Haywood in the [place] pulmonary division and we would recommend that you see him again as typically for carriers have seen them recommend to see you every 5 yrs depending on course.  Dr Hall saw pre retiring and to see someone in Aug,  7/25/23 telemed July 2023 labs sent in showed AST 31 ALT 27 alk phos 88 and total bilirubin 0.6. Sodium 140 potassium 4.1 BUN 17 creatinine 0.99 lipase 36 total protein 8.0 chloride 105. WBC 6.39 hemoglobin 13.7 platelet count 250 MCV 96 neutrophils 2.88 and lymphocytes 2.75. Last year your AST was running about 23 and ALT of 11 so while these labs are in the normal range they are little higher. Any recent illness or changes that could explain why the AST and ALT are running a little bit higher? Please let us know.  July 11, 2023 ultrasound was sent to me.  The liver is homogeneous in echotexture and normal in echogenicity.  No focal lesions.  The gallbladder is thin-walled without any issues.  The common duct is 5 mm and this is normal.  Right kidney 10.3 cm and the left kidney 11 cm and they appear normal.  Spleen normal 8.8 cm.  The hepatic vasculature is patent.  Overall they did not see any signs of cirrhosis or any masses.  The hepatic vasculature was patent.  We will review this at the follow-up.  she notes she has been fogetting  a good bit to take the medicine but now doing better since she got the letter.  she has been exercising but weight stable she has not started any new medications  12/22/22 visit US done today and prelim report with no abnormal findings so this is good to see. her labs have been stable and at goal most recently bili 0.5, alt 32, alk phos ast 34, alp 20 cbc weight is 220 is walking daily  cell cept daily but has missed some doses so maybe why the labs are higher than in may and discussed the compliance due for dexa given [facility]   May 31 2022 ultrasound shows no gallstones. Common bile duct is not dilated at 4 mm.  Liver was homogeneous in echotexture and with no focal abnormality. Imaged pancreas portions were unremarkable. Right kidney was 10.5 cm with no hydronephrosis. Spleen normal at 10.1 cm. Liver vessels patent. Overall the felt it was a negative right upper quadrant sonogram with no focal lesion seen.   May 26 labsL ast 23  tb 0.6. alk 94 nd na 142, co2 28 glu 97  alb 4.5 and bun 15 and k 4.4 and cr 0.95 and lipase 41 and alt 11 and cl 105, wbc 6.58 hg 13.4, plat 255.  Cellcept once a day still.  She did the covid 19 vaccine. She went to [place] and got it around [place].  She will consider booster.  October 18 2021 show a white blood cell count of 5.71 hemoglobin 13.6 plate count 257 MCV 97.4.  Neutrophils 2.56 and lymphocytes 2.49. Your total bilirubin is normal at 0.5 and alkaline phosphatase slightly up at 105.  Your AST was 25 and ALT was 18.  Sodium 143 potassium 4.2 CO2 30 which is slightly up.  Glucose elevated at 115 but as you mentioned not fasting.  Albumin normal at 4.6.  BUN normal at 16.  Creatinine 0.95 also normal.  Lipase normal at 36.  Total protein normal at 8.4.  October 19 2021 ultrasound shows echogenicity to be within normal limits and no focal lesions. Common bile duct was 4 mm which is normal. Gallbladder appeared unremarkable. No ascites was seen. Visualized pancreas portions appeared unremarkable. Right kidney 10.6 cm with no hydronephrosis. Liver vessels patent. Overall they felt that you had a normal exam with patent vessels.  March 19 2021 wbc 5.87 and hg 13.9 and palt 272 and mcv 97.5 and neutrophils 2.47 and can't read the cmp panel but able to get other print out and think ast 29 and tb 0.5 and alk 99 and na 139 and co2 23 and glu 99 and alb 4.6 and bun 19 and cr 0.94 and alt 21 and cl 103 and tp 8.1.  Dec 7 2020  labs just arrived. not sure why so delayed. wbc 5.8 hg 13.4 plat 238. mcv 97.  tb 0.4 and alk 111 and ast 31 and alt 27 and ideal alt less than25. na 141 and k 4.7 and cl 102 and co2 23 and bun 15 and cr 0.94. glu 89. Lipase 39 normal.  ast 25 and alt 17 prior so little higher.   Sept 2020: Labs back ast 29 and tb 0.6 an dalk 100 and na 141 and glu 95 and bun 16 and cr 0.96 and lipase 40 and alt 21 and tp 7.7 wbc6.06 hg 13.6 plat 260 and mcbv 94.7 so stay same dose.  Covid 19 was earlier in 2020.  7-16-20: wbc 6.17 hg 13.4 plat 246. Bun 14 and co2 29 and na 141, ca 10.0, cr 0.9 lipase 42. Tb 0.4 and ast 25 and alk 106 and alt 17.   Prior alk 106 and ast 25 again.   Wbc 6.0 hg 13.6 plat 256. Alb 4.7 and bun 17 and co2 27 and glu 101 and na 140 and alt 19 and ca 10.0, cr 0.9 and k 4.3 and alk 106 slightly up for lab, ast 25 and tb 0.5.  Sept 2020 u.s u.s patent vessels and no suspicious liver lesion. No discrete liver leison. No stones and no duct dilaiton and duct 4,4mm and pancreas normal and spleen 10.8cm and vessels open.  feb Prelim:  Areas to be Scanned:  Gallbladder: wnl.  Bile ducts: 4mm, wnl.  Liver: mild increased echoes of the boone.  Pancreas: wnl.  Spleen: 10.4 x 3.8cm, wnl.  Right Kidney: 10.1 x 3.8 x 5.9cm, wnl.  Other: TEXT   Impression: mpv w/ hepatopetal flow, rt & lt pv, hep art, ivc, hep v's, spl art & v are patent. no masses or fluid vis.   Feb 13 2020 wbc 6.6 hg 13.6 plat 272 and alb 4.4 and tb 0.6 and glu 95 and cl 105 and alk 99 and ast 26 and alt na 140 and alt 22 and co2 28 and cr 0.92 and tp 7.9. k 4.3.  Has only been on the meds once a day for the last 6m and she is on this dose and labs stable stay on the dose.  March 21 2019 na 141 and k 4.3 and cl 106 and co2 29 and glu 94 and cr 0.85 and alb 4.2 and tb 0.5 and ca 9.1 and ast 27 and alt 17 and alk 94.  March 2019 liver somewhat echogenic. Mild fatty infiltration. Vessels patent. Gb unremarkable. CBD 4mm abd right kidney 10.6cm and spleen 10.2cm.   Dec 31 2018 wbc 5.98 hg 13.3 plat 245.  Did the labs on 8th floor [place] and only cbc back: wbc 5.0 hg 13.2 and plat 238.  neutrophils 1.84 and lymph 2.35.  Covid 19 was this spring/summer and later diagnosed and 4 sick in lab.  Aug 31 2018 u.s liver echotexture within normal limits and no mass lesions and gb normal and pancreas normal and spleen 10.2cm. No hydronephrosis right kidney. vessels patent.  Aug 30 2018 wbc 6.21 hg 13.6 plat 258 and alb 4.5 glu 90 cr 1.01 and k 4.5 and na 141 and ast 26 and alt 17 and tb 0.4. alk 109.   Weight is stable.  Prior she had cut herself down to cell cept 500mg po qd and did not tell us x 5 m  last year and we had wanted her to be on cell cept 250mg po bid but she has been taking it as cell cept 250mg  once a day. Since labs stable staying on this.  She is on the calcium and vit d.  June 2018 labs ast 29 and alt 19 and alk 108 and tb 0.5 and so tolerated it. feb 2018 ast 29 and alt 23 and alk 119.  Full labs Feb 22 2018: alb 4.3, ast 27 and alt 20, ca 9.6,k 4.1, alk 119 cr 0.94, bun 16 glu 118.na 140. wbc 5.95 hg 13.2 plat 249, mcv 94.6.   Prior had reaction to ursodiol.  Plan: 1. Check on final u.s. 2. Pt will do labs and u.s in 6m. 3. She will see pulm in aug. 4. She is looking into sleep apnea mouth piece test.  Duration of visit was 40  minutes total with 10 minutes spent preparing chart, and loading information into [facility] with additional 30  minutes spent for this face to face visit with time spent reviewing patient's chart, notes, labs and discussing treatment plan with time spent discussing plans with pt.

## 2024-07-15 ENCOUNTER — LAB OUTSIDE AN ENCOUNTER (OUTPATIENT)
Dept: URBAN - METROPOLITAN AREA CLINIC 86 | Facility: CLINIC | Age: 59
End: 2024-07-15

## 2024-07-25 ENCOUNTER — TELEPHONE ENCOUNTER (OUTPATIENT)
Dept: URBAN - METROPOLITAN AREA CLINIC 86 | Facility: CLINIC | Age: 59
End: 2024-07-25

## 2024-07-25 LAB
ALBUMIN: 4.5
ALKALINE PHOSPHATASE: 101
ALT (SGPT): 24
AST (SGOT): 35
BASO (ABSOLUTE): 0
BASOS: 1
BILIRUBIN, TOTAL: 0.5
BUN/CREATININE RATIO: 14
BUN: 12
CALCIUM: 9.7
CARBON DIOXIDE, TOTAL: 25
CHLORIDE: 101
CREATININE: 0.88
EGFR: 75
EOS (ABSOLUTE): 0.2
EOS: 2
GLOBULIN, TOTAL: 3.1
GLUCOSE: 90
HEMATOCRIT: 43.4
HEMATOLOGY COMMENTS:: (no result)
HEMOGLOBIN: 14.1
IMMATURE CELLS: (no result)
IMMATURE GRANS (ABS): 0
IMMATURE GRANULOCYTES: 0
LIPASE: 35
LYMPHS (ABSOLUTE): 3.1
LYMPHS: 47
MCH: 31.4
MCHC: 32.5
MCV: 97
MONOCYTES(ABSOLUTE): 0.7
MONOCYTES: 10
NEUTROPHILS (ABSOLUTE): 2.6
NEUTROPHILS: 40
NRBC: (no result)
PLATELETS: 258
POTASSIUM: 4.4
PROTEIN, TOTAL: 7.6
RBC: 4.49
RDW: 11.4
SODIUM: 141
WBC: 6.7

## 2024-07-25 NOTE — HPI-TODAY'S VISIT:
Dear Petra Decker,  July 24 labs showed lipase normal at 35 which is good to see.  Glucose was 90 normal and BUN of 12 creatinine 0.88 sodium 141 potassium 4.4 calcium 9.7 albumin 4.5 bili 0.5 alk phos 101 AST was 35 and ALT of 24 and previously AST 29 and ALT 24.  Ideal ALT less than 25.  WBC 6.7 hemoglobin 14.1 platelet count 258 with MCV of 97.  These are normal.  Your Red cell distribution width was a little bit low at 11.4 and previously also been a little low 11.3.  Please share with primary provider.  Neutrophils normal at 2.6 lymphocytes normal at 3.1.  Dr. Hayes

## 2024-09-28 ENCOUNTER — TELEPHONE ENCOUNTER (OUTPATIENT)
Dept: URBAN - METROPOLITAN AREA CLINIC 86 | Facility: CLINIC | Age: 59
End: 2024-09-28

## 2024-09-28 LAB
ALBUMIN: 4.5
ALKALINE PHOSPHATASE: 99
ALT (SGPT): 29
AST (SGOT): 30
BASO (ABSOLUTE): 0
BASOS: 1
BILIRUBIN, TOTAL: 0.4
BUN/CREATININE RATIO: 15
BUN: 14
CALCIUM: 9.4
CARBON DIOXIDE, TOTAL: 24
CHLORIDE: 105
CREATININE: 0.92
EGFR: 71
EOS (ABSOLUTE): 0.1
EOS: 2
GLOBULIN, TOTAL: 2.9
GLUCOSE: 95
HEMATOCRIT: 41.9
HEMATOLOGY COMMENTS:: (no result)
HEMOGLOBIN: 14
IMMATURE CELLS: (no result)
IMMATURE GRANS (ABS): 0
IMMATURE GRANULOCYTES: 0
LIPASE: 42
LYMPHS (ABSOLUTE): 2.8
LYMPHS: 44
MCH: 32.6
MCHC: 33.4
MCV: 98
MONOCYTES(ABSOLUTE): 0.7
MONOCYTES: 11
NEUTROPHILS (ABSOLUTE): 2.7
NEUTROPHILS: 42
NRBC: (no result)
PLATELETS: 258
POTASSIUM: 4.8
PROTEIN, TOTAL: 7.4
RBC: 4.29
RDW: 11.5
SODIUM: 144
WBC: 6.4

## 2024-09-28 NOTE — HPI-TODAY'S VISIT:
Dear Petra Decker, September 26 labs showed lipase of 42 and that is normal. Glucose normal at 95 BUN of 14 creatinine 0.92 sodium 144 potassium 4.8 calcium 9.4 albumin 4.5 bilirubin 0.4 alk phos 99 AST of 30 and ALT of 29.  Ideal ALT is less than 25 so this went up slightly from 24 last time.  Need to look at that and what could be causing this to be higher? WBC 6.4 hemoglobin 14 platelet count 258 these are normal.  Your MCV however slightly up at 98 from previous normal 97.  Your RDW is a little bit low at 11.5. Please share with primary provider to see what may be contributing to this?  Neutrophils normal at 2.7 and lymphocytes normal at 2.8. Dr. Hayes

## 2024-10-15 ENCOUNTER — LAB OUTSIDE AN ENCOUNTER (OUTPATIENT)
Dept: URBAN - METROPOLITAN AREA CLINIC 86 | Facility: CLINIC | Age: 59
End: 2024-10-15

## 2024-10-15 ENCOUNTER — OFFICE VISIT (OUTPATIENT)
Dept: URBAN - METROPOLITAN AREA CLINIC 97 | Facility: CLINIC | Age: 59
End: 2024-10-15
Payer: COMMERCIAL

## 2024-10-15 DIAGNOSIS — K75.4 AUTOIMMUNE HEPATITIS: ICD-10-CM

## 2024-10-15 PROCEDURE — 76705 ECHO EXAM OF ABDOMEN: CPT

## 2024-10-15 PROCEDURE — 93975 VASCULAR STUDY: CPT

## 2024-10-16 ENCOUNTER — TELEPHONE ENCOUNTER (OUTPATIENT)
Dept: URBAN - METROPOLITAN AREA CLINIC 86 | Facility: CLINIC | Age: 59
End: 2024-10-16

## 2024-10-16 NOTE — HPI-TODAY'S VISIT:
Leticia Decker,  October 15 ultrasound shows liver with normal echogenicity and no lesions. Bile ducts not dilated and common bile duct 3.2 mm. Gallbladder was normal and Jung sign negative. Pancreas normal were seen.  Right kidney 11.1 cm with no hydronephrosis.  Spleen normal 9.8 cm.  Liver vessels were patent as expected.   In summary, normal liver echogenicity and no suspicious lesions seen.  Patent vessels were seen as well.  Typically with alpha-1 we like to do a liver screen at least once a year. Will review with you at your visit.   Dr. Hayes

## 2024-10-17 ENCOUNTER — TELEPHONE ENCOUNTER (OUTPATIENT)
Dept: URBAN - METROPOLITAN AREA CLINIC 86 | Facility: CLINIC | Age: 59
End: 2024-10-17

## 2024-10-17 ENCOUNTER — DASHBOARD ENCOUNTERS (OUTPATIENT)
Age: 59
End: 2024-10-17

## 2024-10-17 NOTE — HPI-TODAY'S VISIT:
Dear Petra Decker,  The recent 10/15/24 labs were sent to me.  The white blood cells 5.6, hemoglobin 13.9, MCV 97 and platelets 247 and this is normal.  The glucose was 105 which is slightly elevated for fasting and I would recommend sharing that with your primary care provider.  Sodium 144 and potassium 4.9.  Bilirubin 0.4, alkaline phosphatase 108, AST 27 and ALT 24.  Goal for the AST and ALT is less than 25.  The bilirubin direct was 0.11.  Previously back in July I saw that you had some labs done and at that time the AST 35 and ALT was 24.  So these are lower and happy to see this.  We will review this at your upcoming visit. toma perez PA-C

## 2024-10-21 ENCOUNTER — LAB OUTSIDE AN ENCOUNTER (OUTPATIENT)
Dept: URBAN - METROPOLITAN AREA CLINIC 86 | Facility: CLINIC | Age: 59
End: 2024-10-21

## 2024-10-22 ENCOUNTER — TELEPHONE ENCOUNTER (OUTPATIENT)
Dept: URBAN - METROPOLITAN AREA CLINIC 86 | Facility: CLINIC | Age: 59
End: 2024-10-22

## 2024-10-22 ENCOUNTER — OFFICE VISIT (OUTPATIENT)
Dept: URBAN - METROPOLITAN AREA TELEHEALTH 2 | Facility: TELEHEALTH | Age: 59
End: 2024-10-22

## 2024-10-22 VITALS — WEIGHT: 228 LBS | BODY MASS INDEX: 35.79 KG/M2 | HEIGHT: 67 IN

## 2024-10-22 RX ORDER — DOCUSATE SODIUM 100 MG/1
1 CAPSULE AS NEEDED CAPSULE ORAL ONCE A DAY
Status: ACTIVE | COMMUNITY

## 2024-10-22 RX ORDER — MYCOPHENOLATE MOFETIL 250 MG/1
1 CAPSULES (STABLE ON LOWER DOSE NOW) CAPSULE ORAL ONCE A DAY
Qty: 90 | Refills: 1

## 2024-10-22 RX ORDER — MYCOPHENOLATE MOFETIL 250 MG/1
1 CAPSULES (STABLE ON LOWER DOSE NOW) CAPSULE ORAL ONCE A DAY
Qty: 90 | Refills: 1 | Status: ACTIVE | COMMUNITY

## 2024-10-22 RX ORDER — MYCOPHENOLATE MOFETIL 250 MG/1
TAKE 1 CAPSULE BY MOUTH EVERY DAY CAPSULE ORAL
Qty: 90 CAPSULE | Refills: 0 | Status: ACTIVE | COMMUNITY

## 2024-10-22 RX ORDER — UBIDECARENONE/VIT E ACET 100MG-5
1 CAPSULE CAPSULE ORAL ONCE A DAY
Status: ACTIVE | COMMUNITY

## 2024-10-22 NOTE — HPI-TODAY'S VISIT:
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 Dear Petra Decker, The recent 10/15/24 labs were sent to me. The white blood cells 5.6, hemoglobin 13.9, MCV 97 and platelets 247 and this is normal. The glucose was 105 which is slightly elevated for fasting and I would recommend sharing that with your primary care provider. Sodium 144 and potassium 4.9. Bilirubin 0.4, alkaline phosphatase 108, AST 27 and ALT 24. Goal for the AST and ALT is less than 25. The bilirubin direct was 0.11. Previously back in July I saw that you had some labs done and at that time the AST 35 and ALT was 24. So these are lower and happy to see this. We will review this at your upcoming visit. toma perez PA-C Dear Petra Decker, October 15 ultrasound shows liver with normal echogenicity and no lesions. Bile ducts not dilated and common bile duct 3.2 mm. Gallbladder was normal and Jung sign negative. Pancreas normal were seen. Right kidney 11.1 cm with no hydronephrosis. Spleen normal 9.8 cm. Liver vessels were patent as expected. In summary, normal liver echogenicity and no suspicious lesions seen.  Patent vessels were seen as well.  Typically with alpha-1 we like to do a liver screen at least once a year. Will review with you at your visit. Dr. Freddy Decker, September 26 labs showed lipase of 42 and that is normal. Glucose normal at 95 BUN of 14 creatinine 0.92 sodium 144 potassium 4.8 calcium 9.4 albumin 4.5 bilirubin 0.4 alk phos 99 AST of 30 and ALT of 29. Ideal ALT is less than 25 so this went up slightly from 24 last time. Need to look at that and what could be causing this to be higher? WBC 6.4 hemoglobin 14 platelet count 258 these are normal. Your MCV however slightly up at 98 from previous normal 97. Your RDW is a little bit low at 11.5. Please share with primary provider to see what may be contributing to this? Neutrophils normal at 2.7 and lymphocytes normal at 2.8. Dr. Freddy Decker, July 24 labs showed lipase normal at 35 which is good to see. Glucose was 90 normal and BUN of 12 creatinine 0.88 sodium 141 potassium 4.4 calcium 9.7 albumin 4.5 bili 0.5 alk phos 101 AST was 35 and ALT of 24 and previously AST 29 and ALT 24.  Ideal ALT less than 25. WBC 6.7 hemoglobin 14.1 platelet count 258 with MCV of 97.  These are normal.  Your Red cell distribution width was a little bit low at 11.4 and previously also been a little low 11.3.  Please share with primary provider. Neutrophils normal at 2.6 lymphocytes normal at 3.1. Dr. Freddy Moyar Petra Decker, The recent 10/15/24 labs were sent to me. The white blood cells 5.6, hemoglobin 13.9, MCV 97 and platelets 247 and this is normal. The glucose was 105 which is slightly elevated for fasting and I would recommend sharing that with your primary care provider. Sodium 144 and potassium 4.9. Bilirubin 0.4, alkaline phosphatase 108, AST 27 and ALT 24. Goal for the AST and ALT is less than 25. The bilirubin direct was 0.11. Previously back in July I saw that you had some labs done and at that time the AST 35 and ALT was 24. So these are lower and happy to see this. We will review this at your upcoming visit. toma perez PA-C Dear Petra Decker, October 15 ultrasound shows liver with normal echogenicity and no lesions. Bile ducts not dilated and common bile duct 3.2 mm. Gallbladder was normal and Jung sign negative. Pancreas normal were seen. Right kidney 11.1 cm with no hydronephrosis. Spleen normal 9.8 cm. Liver vessels were patent as expected. In summary, normal liver echogenicity and no suspicious lesions seen.  Patent vessels were seen as well.  Typically with alpha-1 we like to do a liver screen at least once a year. Will review with you at your visit. Dr. Freddy Decker, September 26 labs showed lipase of 42 and that is normal. Glucose normal at 95 BUN of 14 creatinine 0.92 sodium 144 potassium 4.8 calcium 9.4 albumin 4.5 bilirubin 0.4 alk phos 99 AST of 30 and ALT of 29. Ideal ALT is less than 25 so this went up slightly from 24 last time. Need to look at that and what could be causing this to be higher? WBC 6.4 hemoglobin 14 platelet count 258 these are normal. Your MCV however slightly up at 98 from previous normal 97. Your RDW is a little bit low at 11.5. Please share with primary provider to see what may be contributing to this? Neutrophils normal at 2.7 and lymphocytes normal at 2.8. Dr. Freddy Decker, July 24 labs showed lipase normal at 35 which is good to see. Glucose was 90 normal and BUN of 12 creatinine 0.88 sodium 141 potassium 4.4 calcium 9.7 albumin 4.5 bili 0.5 alk phos 101 AST was 35 and ALT of 24 and previously AST 29 and ALT 24.  Ideal ALT less than 25. WBC 6.7 hemoglobin 14.1 platelet count 258 with MCV of 97.  These are normal.  Your Red cell distribution width was a little bit low at 11.4 and previously also been a little low 11.3.  Please share with primary provider. Neutrophils normal at 2.6 lymphocytes normal at 3.1. Dr. Freddy Decker, April 25 ultrasound shows liver to be even in echotexture on the final reading. Common bile duct normal at 5 mm. Gallbladder normal. No significant ascites seen. Right kidney 10.4 cm with no hydronephrosis. Liver and splenic vessels were patent as expected. Spleen was normal at 9.1 cm. In summary, the liver had a normal echotexture with no suspicious lesions. Patent vessels were seen. Dr Freddy Decker, April 25 ultrasound shows liver to be even in echotexture on the final reading. Common bile duct normal at 5 mm. Gallbladder normal. No significant ascites seen. Right kidney 10.4 cm with no hydronephrosis. Liver and splenic vessels were patent as expected. Spleen was normal at 9.1 cm. In summary, the liver had a normal echotexture with no suspicious lesions. Patent vessels were seen. Dr Hayes April 22 labs showed lipase normal at 38.Glucose was 101 slightly up and unclear if you were fasting? BUN of 13 creatinine 0.9 sodium 139 potassium 4.5 calcium 9.3 albumin 4.4 bilirubin 0.4 alk phos 104 AST 29 ALT 24 with ideal ALT less than 25.Direct bilirubin was normal at 0.1.WBC 6.4 hemoglobin 13.7 platelet count 264 and these are normal. Your MCV was slightly up at 98 and your RDW was slightly low 11.3 and please share with primary provider to assess and compare to recent labs.Neutrophils normal at 3.2 and lymphocytes normal at 2.5.It appears that you have an appointment coming up on April 25 and we will discuss this further with you then. She has been cuttingback on meat and so that could be issue. Taking geritol. Mon told mod sleep apnea and to get cpap vs mouth piece. She says being fit for this. She did u.s now.  Toma  They sent a note from [facility] dated July 28, 2023 reminding us to remind you that with your alpha-1 antitrypsin deficiency you should be encouraged to do pulmonary follow-up annually. I think they may be talking more so about the full disease rather than the carrier state as you have but it has been a while since you have been seen for your alpha-1 MZ and I saw Toma and you discussed this also. You previously saw Dr. Yoav Haywood in the [facility] and we would recommend that you see him again as typically for carriers have seen them recommend to see you every 5 yrs depending on course. Dr Hall saw pre retiring and to see someone in Aug, 7/25/23 telemed July 2023 labs sent in showed AST 31 ALT 27 alk phos 88 and total bilirubin 0.6. Sodium 140 potassium 4.1 BUN 17 creatinine 0.99 lipase 36 total protein 8.0 chloride 105. WBC 6.39 hemoglobin 13.7 platelet count 250 MCV 96 neutrophils 2.88 and lymphocytes 2.75. Last year your AST was running about 23 and ALT of 11 so while these labs are in the normal range they are little higher. Any recent illness or changes that could explain why the AST and ALT are running a little bit higher? Please let us know. July 11, 2023 ultrasound was sent to me. The liver is homogeneous in echotexture and normal in echogenicity. No focal lesions. The gallbladder is thin-walled without any issues. The common duct is 5 mm and this is normal. Right kidney 10.3 cm and the left kidney 11 cm and they appear normal. Spleen normal 8.8 cm. The hepatic vasculature is patent. Overall they did not see any signs of cirrhosis or any masses. The hepatic vasculature was patent. We will review this at the follow-up. she notes she has been fogetting a good bit to take the medicine but now doing better since she got the letter. she has been exercising but weight stable she has not started any new medications 12/22/22 visit US done today and prelim report with no abnormal findings so this is good to see. her labs have been stable and at goal most recently bili 0.5, alt 32, alk phos ast 34, alp 20 cbc weight is 220 is walking daily cell cept daily but has missed some doses so maybe why the labs are higher than in may and discussed the compliance due for dexa given [facility] May 31 2022 ultrasound shows no gallstones. Common bile duct is not dilated at 4 mm. Liver was homogeneous in echotexture and with no focal abnormality. Imaged pancreas portions were unremarkable. Right kidney was 10.5 cm with no hydronephrosis. Spleen normal at 10.1 cm. Liver vessels patent. Overall the felt it was a negative right upper quadrant sonogram with no focal lesion seen. May 26 labsL ast 23 tb 0.6. alk 94 nd na 142, co2 28 glu 97 alb 4.5 and bun 15 and k 4.4 and cr 0.95 and lipase 41 and alt 11 and cl 105, wbc 6.58 hg 13.4, plat 255. Cellcept once a day still. She did the covid 19 vaccine. She went to [place] and got it around [place]. She will consider booster. October 18 2021 show a white blood cell count of 5.71 hemoglobin 13.6 plate count 257 MCV 97.4. Neutrophils 2.56 and lymphocytes 2.49. Your total bilirubin is normal at 0.5 and alkaline phosphatase slightly up at 105. Your AST was 25 and ALT was 18. Sodium 143 potassium 4.2 CO2 30 which is slightly up. Glucose elevated at 115 but as you mentioned not fasting. Albumin normal at 4.6. BUN normal at 16. Creatinine 0.95 also normal. Lipase normal at 36. Total protein normal at 8.4. October 19 2021 ultrasound shows echogenicity to be within normal limits and no focal lesions. Common bile duct was 4 mm which is normal. Gallbladder appeared unremarkable. No ascites was seen. Visualized pancreas portions appeared unremarkable. Right kidney 10.6 cm with no hydronephrosis. Liver vessels patent. Overall they felt that you had a normal exam with patent vessels. March 19 2021 wbc 5.87 and hg 13.9 and palt 272 and mcv 97.5 and neutrophils 2.47 and can't read the cmp panel but able to get other print out and think ast 29 and tb 0.5 and alk 99 and na 139 and co2 23 and glu 99 and alb 4.6 and bun 19 and cr 0.94 and alt 21 and cl 103 and tp 8.1. Dec 7 2020 labs just arrived. not sure why so delayed. wbc 5.8 hg 13.4 plat 238. mcv 97. tb 0.4 and alk 111 and ast 31 and alt 27 and ideal alt less than25. na 141 and k 4.7 and cl 102 and co2 23 and bun 15 and cr 0.94. glu 89. Lipase 39 normal. ast 25 and alt 17 prior so little higher. Sept 2020: Labs back ast 29 and tb 0.6 an dalk 100 and na 141 and glu 95 and bun 16 and cr 0.96 and lipase 40 and alt 21 and tp 7.7 wbc6.06 hg 13.6 plat 260 and mcbv 94.7 so stay same dose. Covid 19 was earlier in 2020. 7-16-20: wbc 6.17 hg 13.4 plat 246. Bun 14 and co2 29 and na 141, ca 10.0, cr 0.9 lipase 42. Tb 0.4 and ast 25 and alk 106 and alt 17. Prior alk 106 and ast 25 again. Wbc 6.0 hg 13.6 plat 256. Alb 4.7 and bun 17 and co2 27 and glu 101 and na 140 and alt 19 and ca 10.0, cr 0.9 and k 4.3 and alk 106 slightly up for lab, ast 25 and tb 0.5. Sept 2020 u.s u.s patent vessels and no suspicious liver lesion. No discrete liver leison. No stones and no duct dilaiton and duct 4,4mm and pancreas normal and spleen 10.8cm and vessels open. feb Prelim: Areas to be Scanned: Gallbladder: wnl. Bile ducts: 4mm, wnl. Liver: mild increased echoes of the boone. Pancreas: wnl. Spleen: 10.4 x 3.8cm, wnl. Right Kidney: 10.1 x 3.8 x 5.9cm, wnl. Other: TEXT Impression: mpv w/ hepatopetal flow, rt & lt pv, hep art, ivc, hep v's, spl art & v are patent. no masses or fluid vis. Feb 13 2020 wbc 6.6 hg 13.6 plat 272 and alb 4.4 and tb 0.6 and glu 95 and cl 105 and alk 99 and ast 26 and alt na 140 and alt 22 and co2 28 and cr 0.92 and tp 7.9. k 4.3. Has only been on the meds once a day for the last 6m and she is on this dose and labs stable stay on the dose. March 21 2019 na 141 and k 4.3 and cl 106 and co2 29 and glu 94 and cr 0.85 and alb 4.2 and tb 0.5 and ca 9.1 and ast 27 and alt 17 and alk 94. March 2019 liver somewhat echogenic. Mild fatty infiltration. Vessels patent. Gb unremarkable. CBD 4mm abd right kidney 10.6cm and spleen 10.2cm. Dec 31 2018 wbc 5.98 hg 13.3 plat 245. Did the labs on 8th floor [place] and only cbc back: wbc 5.0 hg 13.2 and plat 238. neutrophils 1.84 and lymph 2.35. Covid 19 was this spring/summer and later diagnosed and 4 sick in lab. Aug 31 2018 u.s liver echotexture within normal limits and no mass lesions and gb normal and pancreas normal and spleen 10.2cm. No hydronephrosis right kidney. vessels patent. Aug 30 2018 wbc 6.21 hg 13.6 plat 258 and alb 4.5 glu 90 cr 1.01 and k 4.5 and na 141 and ast 26 and alt 17 and tb 0.4. alk 109. Weight is stable. Prior she had cut herself down to cell cept 500mg po qd and did not tell us x 5 m last year and we had wanted her to be on cell cept 250mg po bid but she has been taking it as cell cept 250mg once a day. Since labs stable staying on this. She is on the calcium and vit d. June 2018 labs ast 29 and alt 19 and alk 108 and tb 0.5 and so tolerated it. feb 2018 ast 29 and alt 23 and alk 119. Full labs Feb 22 2018: alb 4.3, ast 27 and alt 20, ca 9.6,k 4.1, alk 119 cr 0.94, bun 16 glu 118.na 140. wbc 5.95 hg 13.2 plat 249, mcv 94.6. Prior had reaction to ursodiol. Plan: 1. Check on final u.s. 2. Pt will do labs and u.s in 6m. 3. She will see pulm in aug. 4. She is looking into sleep apnea mouth piece test. Duration of visit was  minutes total with 10 minutes spent preparing chart, and loading information into [facility] with additional 30  minutes spent for this face to face visit with time spent reviewing patient's chart, notes, labs and discussing treatment plan with time spent discussing plans with pt.

## 2025-02-09 ENCOUNTER — TELEPHONE ENCOUNTER (OUTPATIENT)
Dept: URBAN - METROPOLITAN AREA CLINIC 86 | Facility: CLINIC | Age: 60
End: 2025-02-09

## 2025-02-09 ENCOUNTER — ERX REFILL RESPONSE (OUTPATIENT)
Dept: URBAN - METROPOLITAN AREA CLINIC 86 | Facility: CLINIC | Age: 60
End: 2025-02-09

## 2025-02-09 RX ORDER — MYCOPHENOLATE MOFETIL 250 MG/1
1 CAPSULES (STABLE ON LOWER DOSE NOW) CAPSULE ORAL ONCE A DAY
Qty: 90 | Refills: 1 | OUTPATIENT

## 2025-02-09 RX ORDER — MYCOPHENOLATE MOFETIL 250 MG/1
1 CAPSULES (STABLE ON LOWER DOSE NOW) ORALLY ONCE A DAY 90 DAYS CAPSULE ORAL
Qty: 90 CAPSULE | Refills: 0 | OUTPATIENT

## 2025-02-18 ENCOUNTER — TELEPHONE ENCOUNTER (OUTPATIENT)
Dept: URBAN - METROPOLITAN AREA CLINIC 86 | Facility: CLINIC | Age: 60
End: 2025-02-18

## 2025-02-18 LAB
ALBUMIN: 4.4
ALKALINE PHOSPHATASE: 108
ALT (SGPT): 24
AST (SGOT): 26
BASO (ABSOLUTE): 0.1
BASOS: 1
BILIRUBIN, TOTAL: 0.4
BUN/CREATININE RATIO: 17
BUN: 15
CALCIUM: 9.8
CARBON DIOXIDE, TOTAL: 28
CHLORIDE: 103
CREATININE: 0.87
EGFR: 76
EOS (ABSOLUTE): 0.2
EOS: 3
GLOBULIN, TOTAL: 3
GLUCOSE: 94
HEMATOCRIT: 42.9
HEMATOLOGY COMMENTS:: (no result)
HEMOGLOBIN: 14.1
IMMATURE CELLS: (no result)
IMMATURE GRANS (ABS): 0
IMMATURE GRANULOCYTES: 0
LIPASE: 39
LYMPHS (ABSOLUTE): 2.9
LYMPHS: 44
MCH: 31.8
MCHC: 32.9
MCV: 97
MONOCYTES(ABSOLUTE): 0.6
MONOCYTES: 9
NEUTROPHILS (ABSOLUTE): 2.9
NEUTROPHILS: 43
NRBC: (no result)
PLATELETS: 247
POTASSIUM: 4.9
PROTEIN, TOTAL: 7.4
RBC: 4.43
RDW: 11.7
SODIUM: 142
WBC: 6.6

## 2025-02-18 NOTE — HPI-TODAY'S VISIT:
Dear Petra Decker,   February 17 labs show us that your lipase is normal at 39. WBC 6.6 hemoglobin 14.1 MCV normal at 97 platelet count 247 with normal neutrophils and lymphocytes at 2.9 and 2.9 respectively. Glucose normal at 94 BUN of 15 creat 0.87 sodium 142 potassium 4.9 calcium 9.8 albumin 4.4 bilirubin 0.4 alk phos 108 AST 26 and ALT of 24 with ideal ALT less than 25. Good to see that your labs are stable and please share with local providers. Dr. Hayes

## 2025-04-28 ENCOUNTER — OFFICE VISIT (OUTPATIENT)
Dept: URBAN - METROPOLITAN AREA CLINIC 86 | Facility: CLINIC | Age: 60
End: 2025-04-28

## 2025-04-30 ENCOUNTER — TELEPHONE ENCOUNTER (OUTPATIENT)
Dept: URBAN - METROPOLITAN AREA CLINIC 86 | Facility: CLINIC | Age: 60
End: 2025-04-30

## 2025-04-30 LAB
ALBUMIN: 4.4
ALKALINE PHOSPHATASE: 94
ALT (SGPT): 25
AST (SGOT): 30
BASO (ABSOLUTE): 0
BASOS: 1
BILIRUBIN, TOTAL: 0.5
BUN/CREATININE RATIO: 16
BUN: 14
CALCIUM: 9.5
CARBON DIOXIDE, TOTAL: 21
CHLORIDE: 105
CREATININE: 0.85
EGFR: 78
EOS (ABSOLUTE): 0.1
EOS: 3
GLOBULIN, TOTAL: 3
GLUCOSE: 96
HEMATOCRIT: 40.2
HEMATOLOGY COMMENTS:: (no result)
HEMOGLOBIN: 13.5
IMMATURE CELLS: (no result)
IMMATURE GRANS (ABS): 0
IMMATURE GRANULOCYTES: 0
LIPASE: 148
LYMPHS (ABSOLUTE): 2.3
LYMPHS: 41
MCH: 31.8
MCHC: 33.6
MCV: 95
MONOCYTES(ABSOLUTE): 0.6
MONOCYTES: 10
NEUTROPHILS (ABSOLUTE): 2.6
NEUTROPHILS: 45
NRBC: (no result)
PLATELETS: 246
POTASSIUM: 4.6
PROTEIN, TOTAL: 7.4
RBC: 4.24
RDW: 11.4
SODIUM: 141
WBC: 5.6

## 2025-04-30 NOTE — HPI-TODAY'S VISIT:
Dear Petra Decker,    April 29 labs showed that your lipase was unusually up at 148 and had been previously 39.  Sometimes having a gastrointestinal illness or other issue could be a factor.  Any recent episode of GI distress?  So unusual for you to have any issues with this after 68 years.   Glucose 96 BUN 14 creatinine 0.85 sodium 141 potassium 4.6 calcium 9.5 albumin 4.4 globin 0.5 alk phos 94 AST 30 ALT 25 and previously AST 30 and ALT of 29 so liver labs for you are stable.  Ideal ALT is less than 25.   WBC 5.6 hemoglobin 13.5 platelet count 246 these are normal.  MCV normal at 95.  Neutrophils and lymphocytes normal.  RDW which stands for Red cell distribution width was a little bit low at 11.4 and that was normal before on the prior labs at 11.7 but a little low at 11.5 on the 1 before so that has been noted occasionally on you.   Called pt to see what if anything changed?  She said that she had a viral illness and that may have lipase as she and  were ill and shesays he also had a lipase up on his labs.  She says when ill of cold remedies. Redo this in 2-3 weeks to see if it resolves.   Dr Hayes

## 2025-05-01 ENCOUNTER — OFFICE VISIT (OUTPATIENT)
Dept: URBAN - METROPOLITAN AREA CLINIC 86 | Facility: CLINIC | Age: 60
End: 2025-05-01

## 2025-05-01 ENCOUNTER — OFFICE VISIT (OUTPATIENT)
Dept: URBAN - METROPOLITAN AREA CLINIC 86 | Facility: CLINIC | Age: 60
End: 2025-05-01
Payer: COMMERCIAL

## 2025-05-01 DIAGNOSIS — Z71.89 VACCINE COUNSELING: ICD-10-CM

## 2025-05-01 DIAGNOSIS — Z79.899 HIGH RISK MEDICATION USE: ICD-10-CM

## 2025-05-01 DIAGNOSIS — R74.8 ELEVATED LIPASE: ICD-10-CM

## 2025-05-01 DIAGNOSIS — K21.9 GASTROESOPHAGEAL REFLUX DISEASE WITHOUT ESOPHAGITIS: ICD-10-CM

## 2025-05-01 DIAGNOSIS — Z12.11 COLON CANCER SCREENING: ICD-10-CM

## 2025-05-01 DIAGNOSIS — Z14.8 ALPHA-1-ANTITRYPSIN DEFICIENCY CARRIER: ICD-10-CM

## 2025-05-01 DIAGNOSIS — D57.3 SICKLE CELL TRAIT: ICD-10-CM

## 2025-05-01 DIAGNOSIS — K75.4 AUTOIMMUNE HEPATITIS: ICD-10-CM

## 2025-05-01 DIAGNOSIS — E66.811 CLASS 1 OBESITY DUE TO EXCESS CALORIES WITHOUT SERIOUS COMORBIDITY WITH BODY MASS INDEX (BMI) OF 31.0 TO 31.9 IN ADULT: ICD-10-CM

## 2025-05-01 DIAGNOSIS — Z98.890 HISTORY OF COLONOSCOPY: ICD-10-CM

## 2025-05-01 DIAGNOSIS — I10 ESSENTIAL HYPERTENSION: ICD-10-CM

## 2025-05-01 DIAGNOSIS — E61.1 IRON DEFICIENCY: ICD-10-CM

## 2025-05-01 PROCEDURE — 99215 OFFICE O/P EST HI 40 MIN: CPT

## 2025-05-01 RX ORDER — MYCOPHENOLATE MOFETIL 250 MG/1
1 CAPSULES CAPSULE ORAL ONCE A DAY
Qty: 90 CAPSULE | Refills: 1
Start: 2025-04-27

## 2025-05-01 RX ORDER — MYCOPHENOLATE MOFETIL 250 MG/1
1 CAPSULES (STABLE ON LOWER DOSE NOW) ORALLY ONCE A DAY 90 DAYS CAPSULE ORAL
Qty: 90 CAPSULE | Refills: 0 | Status: ACTIVE | COMMUNITY

## 2025-05-01 RX ORDER — UBIDECARENONE/VIT E ACET 100MG-5
1 CAPSULE CAPSULE ORAL ONCE A DAY
Status: ON HOLD | COMMUNITY

## 2025-05-01 RX ORDER — DOCUSATE SODIUM 100 MG/1
1 CAPSULE AS NEEDED CAPSULE ORAL ONCE A DAY
Status: ACTIVE | COMMUNITY

## 2025-05-01 NOTE — HPI-TODAY'S VISIT:
Pt is a 59 year old /Black female, seen Oct 2024  for an evaluation for elevated liver function tests and due to autoimmune liver disorder and alpha one carrier state.  April 29 labs showed that your lipase was unusually up at 148 and had been previously 39.  Sometimes having a gastrointestinal illness or other issue could be a factor.  Any recent episode of GI distress?  So unusual for you to have any issues with this after 70 years. Glucose 96 BUN 14 creatinine 0.85 sodium 141 potassium 4.6 calcium 9.5 albumin 4.4 globin 0.5 alk phos 94 AST 30 ALT 25 and previously AST 30 and ALT of 29 so liver labs for you are stable.  Ideal ALT is less than 25. WBC 5.6 hemoglobin 13.5 platelet count 246 these are normal.  MCV normal at 95.  Neutrophils and lymphocytes normal.  RDW which stands for Red cell distribution width was a little bit low at 11.4 and that was normal before on the prior labs at 11.7 but a little low at 11.5 on the 1 before so that has been noted occasionally on you. Called pt to see what if anything changed?  She said that she had a viral illness and that may have lipase as she and  were ill and shesays he also had a lipase up on his labs.  She says when ill of cold remedies.  I suspect both she and he both ad some syndrome that affected and was not covid but something else.  Need to follow this.  She says prior had leg tingling and worried re vit d. She stopped it and tingling stopped.   February 17 labs show us that your lipase is normal at 39. WBC 6.6 hemoglobin 14.1 MCV normal at 97 platelet count 247 with normal neutrophils and lymphocytes at 2.9 and 2.9 respectively. Glucose normal at 94 BUN of 15 creat 0.87 sodium 142 potassium 4.9 calcium 9.8 albumin 4.4 bilirubin 0.4 alk phos 108 AST 26 and ALT of 24 with ideal ALT less than 25.  10/15/24 labs were sent to me. The white blood cells 5.6, hemoglobin 13.9, MCV 97 and platelets 247 and this is normal. The glucose was 105 which is slightly elevated for fasting and I would recommend sharing that with your primary care provider. Sodium 144 and potassium 4.9. Bilirubin 0.4, alkaline phosphatase 108, AST 27 and ALT 24. Goal for the AST and ALT is less than 25. The bilirubin direct was 0.11. Previously back in July I saw that you had some labs done and at that time the AST 35 and ALT was 24. So these are lower and happy to see this. We will review this at your upcoming visit.  She did not do an u.s.  October 15 2024 ultrasound shows liver with normal echogenicity and no lesions. Bile ducts not dilated and common bile duct 3.2 mm. Gallbladder was normal and Jung sign negative. Pancreas normal were seen. Right kidney 11.1 cm with no hydronephrosis. Spleen normal 9.8 cm. Liver vessels were patent as expected. In summary, normal liver echogenicity and no suspicious lesions seen.  Patent vessels were seen as well.  Typically with alpha-1 we like to do a liver screen at least once a year.  Re weight she says the same.  She bought and exercise machine to do pull ups.  She wants to do a tummy tuck.  September 26 labs showed lipase of 42 and that is normal. Glucose normal at 95 BUN of 14 creatinine 0.92 sodium 144 potassium 4.8 calcium 9.4 albumin 4.5 bilirubin 0.4 alk phos 99 AST of 30 and ALT of 29. Ideal ALT is less than 25 so this went up slightly from 24 last time. Need to look at that and what could be causing this to be higher? WBC 6.4 hemoglobin 14 platelet count 258 these are normal. Your MCV however slightly up at 98 from previous normal 97. Your RDW is a little bit low at 11.5. Please share with primary provider to see what may be contributing to this? Neutrophils normal at 2.7 and lymphocytes normal at 2.8.  July 24 labs showed lipase normal at 35 which is good to see. Glucose was 90 normal and BUN of 12 creatinine 0.88 sodium 141 potassium 4.4 calcium 9.7 albumin 4.5 bili 0.5 alk phos 101 AST was 35 and ALT of 24 and previously AST 29 and ALT 24.  Ideal ALT less than 25. WBC 6.7 hemoglobin 14.1 platelet count 258 with MCV of 97.  These are normal.  Your Red cell distribution width was a little bit low at 11.4 and previously also been a little low 11.3.  Please share with primary provider. Neutrophils normal at 2.6 lymphocytes normal at 3.1.  The recent 10/15/24 labs were sent to me. The white blood cells 5.6, hemoglobin 13.9, MCV 97 and platelets 247 and this is normal. The glucose was 105 which is slightly elevated for fasting and I would recommend sharing that with your primary care provider. Sodium 144 and potassium 4.9. Bilirubin 0.4, alkaline phosphatase 108, AST 27 and ALT 24. Goal for the AST and ALT is less than 25. The bilirubin direct was 0.11. Previously back in July I saw that you had some labs done and at that time the AST 35 and ALT was 24. So these are lower and happy to see this. We will review this at your upcoming visit.  September 26 labs showed lipase of 42 and that is normal.Glucose normal at 95 BUN of 14 creatinine 0.92 sodium 144 potassium 4.8 calcium 9.4 albumin 4.5 bilirubin 0.4 alk phos 99 AST of 30 and ALT of 29. Ideal ALT is less than 25 so this went up slightly from 24 last time. Need to look at that and what could be causing this to be higher? WBC 6.4 hemoglobin 14 platelet count 258 these are normal. Your MCV however slightly up at 98 from previous normal 97. Your RDW is a little bit low at 11.5.  July 24 labs showed lipase normal at 35 which is good to see. Glucose was 90 normal and BUN of 12 creatinine 0.88 sodium 141 potassium 4.4 calcium 9.7 albumin 4.5 bili 0.5 alk phos 101 AST was 35 and ALT of 24 and previously AST 29 and ALT 24.  Ideal ALT less than 25. WBC 6.7 hemoglobin 14.1 platelet count 258 with MCV of 97.  These are normal.  Your Red cell distribution width was a little bit low at 11.4 and previously also been a little low 11.3.  Please share with primary provider. Neutrophils normal at 2.6 lymphocytes normal at 3.1.   April 25 ultrasound shows liver to be even in echotexture on the final reading. Common bile duct normal at 5 mm. Gallbladder normal. No significant ascites seen. Right kidney 10.4 cm with no hydronephrosis. Liver and splenic vessels were patent as expected. Spleen was normal at 9.1 cm. In summary, the liver had a normal echotexture with no suspicious lesions. Patent vessels were seen.  April 22 labs showed lipase normal at 38.Glucose was 101 slightly up and unclear if you were fasting? BUN of 13 creatinine 0.9 sodium 139 potassium 4.5 calcium 9.3 albumin 4.4 bilirubin 0.4 alk phos 104 AST 29 ALT 24 with ideal ALT less than 25.Direct bilirubin was normal at 0.1.WBC 6.4 hemoglobin 13.7 platelet count 264 and these are normal. Your MCV was slightly up at 98 and your RDW was slightly low 11.3 and please share with primary provider to assess and compare to recent labs.Neutrophils normal at 3.2 and lymphocytes normal at 2.5.It appears that you have an appointment coming up on April 25 and we will discuss this further with you then.  She has been cuttingback on meat and so that could be issue.  Taking geritol.  Mon told mod sleep apnea and to get cpap vs mouth piece.  They sent a note from [facility] dated July 28, 2023 reminding us to remind you that with your alpha-1 antitrypsin deficiency you should be encouraged to do pulmonary follow-up annually. I think they may be talking more so about the full disease rather than the carrier state as you have but it has been a while since you have been seen for your alpha-1 MZ and I saw Nina and you discussed this also. You previously saw Dr. Yoav Haywood in the [place] [facility] and we would recommend that you see him again as typically for carriers have seen them recommend to see you every 5 yrs depending on course.  Dr Hall saw pre retiring and to see someone in Aug,  7/25/23 telemed  July 2023 labs sent in showed AST 31 ALT 27 alk phos 88 and total bilirubin 0.6.  Sodium 140 potassium 4.1 BUN 17 creatinine 0.99 lipase 36 total protein 8.0 chloride 105.  WBC 6.39 hemoglobin 13.7 platelet count 250 MCV 96 neutrophils 2.88 and lymphocytes 2.75.  Last year your AST was running about 23 and ALT of 11 so while these labs are in the normal range they are little higher.  Any recent illness or changes that could explain why the AST and ALT are running a little bit higher?  Please let us know.  July 11, 2023 ultrasound was sent to me. The liver is homogeneous in echotexture and normal in echogenicity. No focal lesions. The gallbladder is thin-walled without any issues. The common duct is 5 mm and this is normal. Right kidney 10.3 cm and the left kidney 11 cm and they appear normal. Spleen normal 8.8 cm. The hepatic vasculature is patent. Overall they did not see any signs of cirrhosis or any masses. The hepatic vasculature was patent. We will review this at the follow-up.  she notes she has been fogetting a good bit to take the medicine but now doing better since she got the letter.  she has been exercising but weight stable  she has not started any new medications  12/22/22 visit  US done today and prelim report with no abnormal findings so this is good to see.  her labs have been stable and at goal most recently bili 0.5, alt 32, alk phos ast 34, alp 20  cbc  weight is 220  is walking daily  cell cept daily but has missed some doses so maybe why the labs are higher than in may and discussed the compliance  due for dexa given [facility]  May 31 2022 ultrasound shows no gallstones.  Common bile duct is not dilated at 4 mm. Liver was homogeneous in echotexture and with no focal abnormality.  Imaged pancreas portions were unremarkable.  Right kidney was 10.5 cm with no hydronephrosis.  Spleen normal at 10.1 cm.  Liver vessels patent.  Overall the felt it was a negative right upper quadrant sonogram with no focal lesion seen.  May 26 labsL ast 23 tb 0.6. alk 94 nd na 142, co2 28 glu 97 alb 4.5 and bun 15 and k 4.4 and cr 0.95 and lipase 41 and alt 11 and cl 105, wbc 6.58 hg 13.4, plat 255.  Cellcept once a day still.  She did the covid 19 vaccine. She went to [place] and got it around [place].  She will consider booster.  October 18 2021 show a white blood cell count of 5.71 hemoglobin 13.6 plate count 257 MCV 97.4. Neutrophils 2.56 and lymphocytes 2.49.  Your total bilirubin is normal at 0.5 and alkaline phosphatase slightly up at 105. Your AST was 25 and ALT was 18. Sodium 143 potassium 4.2 CO2 30 which is slightly up. Glucose elevated at 115 but as you mentioned not fasting. Albumin normal at 4.6. BUN normal at 16. Creatinine 0.95 also normal. Lipase normal at 36. Total protein normal at 8.4.  October 19 2021 ultrasound shows echogenicity to be within normal limits and no focal lesions.  Common bile duct was 4 mm which is normal.  Gallbladder appeared unremarkable.  No ascites was seen.  Visualized pancreas portions appeared unremarkable.  Right kidney 10.6 cm with no hydronephrosis.  Liver vessels patent.  Overall they felt that you had a normal exam with patent vessels.  March 19 2021 wbc 5.87 and hg 13.9 and palt 272 and mcv 97.5 and neutrophils 2.47 and can't read the cmp panel but able to get other print out and think ast 29 and tb 0.5 and alk 99 and na 139 and co2 23 and glu 99 and alb 4.6 and bun 19 and cr 0.94 and alt 21 and cl 103 and tp 8.1.  Dec 7 2020 labs just arrived. not sure why so delayed. wbc 5.8 hg 13.4 plat 238. mcv 97. tb 0.4 and alk 111 and ast 31 and alt 27 and ideal alt less than25. na 141 and k 4.7 and cl 102 and co2 23 and bun 15 and cr 0.94. glu 89. Lipase 39 normal. ast 25 and alt 17 prior so little higher.  Sept 2020:  Labs back ast 29 and tb 0.6 an dalk 100 and na 141 and glu 95 and bun 16 and cr 0.96 and lipase 40 and alt 21 and tp 7.7 wbc6.06 hg 13.6 plat 260 and mcbv 94.7 so stay same dose.  Covid 19 was earlier in 2020.  7-16-20: wbc 6.17 hg 13.4 plat 246. Bun 14 and co2 29 and na 141, ca 10.0, cr 0.9 lipase 42. Tb 0.4 and ast 25 and alk 106 and alt 17.  Prior alk 106 and ast 25 again.  Wbc 6.0 hg 13.6 plat 256. Alb 4.7 and bun 17 and co2 27 and glu 101 and na 140 and alt 19 and ca 10.0, cr 0.9 and k 4.3 and alk 106 slightly up for lab, ast 25 and tb 0.5.  Sept 2020 u.s u.s patent vessels and no suspicious liver lesion. No discrete liver leison. No stones and no duct dilaiton and duct 4,4mm and pancreas normal and spleen 10.8cm and vessels open.  feb Prelim:  Areas to be Scanned:  Gallbladder: wnl.  Bile ducts: 4mm, wnl.  Liver: mild increased echoes of the boone.  Pancreas: wnl.  Spleen: 10.4 x 3.8cm, wnl.  Right Kidney: 10.1 x 3.8 x 5.9cm, wnl.  Other: TEXT  Impression: mpv w/ hepatopetal flow, rt & lt pv, hep art, ivc, hep v's, spl art & v are patent. no masses or fluid vis.  Feb 13 2020 wbc 6.6 hg 13.6 plat 272 and alb 4.4 and tb 0.6 and glu 95 and cl 105 and alk 99 and ast 26 and alt na 140 and alt 22 and co2 28 and cr 0.92 and tp 7.9. k 4.3.  Has only been on the meds once a day for the last 6m and she is on this dose and labs stable stay on the dose.  March 21 2019 na 141 and k 4.3 and cl 106 and co2 29 and glu 94 and cr 0.85 and alb 4.2 and tb 0.5 and ca 9.1 and ast 27 and alt 17 and alk 94.  March 2019 liver somewhat echogenic. Mild fatty infiltration. Vessels patent. Gb unremarkable. CBD 4mm abd right kidney 10.6cm and spleen 10.2cm.  Dec 31 2018 wbc 5.98 hg 13.3 plat 245.  Did the labs on 8th floor [place] and only cbc back: wbc 5.0 hg 13.2 and plat 238. neutrophils 1.84 and lymph 2.35.  Covid 19 was this spring/summer and later diagnosed and 4 sick in lab.  Aug 31 2018 u.s liver echotexture within normal limits and no mass lesions and gb normal and pancreas normal and spleen 10.2cm. No hydronephrosis right kidney. vessels patent.  Aug 30 2018 wbc 6.21 hg 13.6 plat 258 and alb 4.5 glu 90 cr 1.01 and k 4.5 and na 141 and ast 26 and alt 17 and tb 0.4. alk 109.  Weight is stable.  Prior she had cut herself down to cell cept 500mg po qd and did not tell us x 5 m last year and we had wanted her to be on cell cept 250mg po bid but she has been taking it as cell cept 250mg once a day. Since labs stable staying on this. She is on the calcium and vit d.  June 2018 labs ast 29 and alt 19 and alk 108 and tb 0.5 and so tolerated it. feb 2018 ast 29 and alt 23 and alk 119.  Full labs Feb 22 2018: alb 4.3, ast 27 and alt 20, ca 9.6,k 4.1, alk 119 cr 0.94, bun 16 glu 118.na 140. wbc 5.95 hg 13.2 plat 249, mcv 94.6.  Prior had reaction to ursodiol.  Plan:  1. Check on the is in oct. 2. Pt will stay on healthy habits and diet and exercisxe. 3. Suspect recent viral syndrome affected her and / 4. Redo labs in 1m and oct. 5. She si considering pastic surgery and iscusses best to do when at steady weight   Duration of visit was 45 minutes total with 15 minutes spent preparing chart, and loading information into [facility] with additional   minutes spent for this face to face visit with time spent reviewing patient's chart, notes, labs and discussing treatment plan with time spent discussing plans with pt.

## 2025-05-05 ENCOUNTER — LAB OUTSIDE AN ENCOUNTER (OUTPATIENT)
Dept: URBAN - METROPOLITAN AREA CLINIC 86 | Facility: CLINIC | Age: 60
End: 2025-05-05

## 2025-05-09 ENCOUNTER — LAB OUTSIDE AN ENCOUNTER (OUTPATIENT)
Dept: URBAN - METROPOLITAN AREA CLINIC 86 | Facility: CLINIC | Age: 60
End: 2025-05-09

## 2025-05-19 ENCOUNTER — LAB OUTSIDE AN ENCOUNTER (OUTPATIENT)
Dept: URBAN - METROPOLITAN AREA CLINIC 86 | Facility: CLINIC | Age: 60
End: 2025-05-19

## 2025-06-16 ENCOUNTER — LAB OUTSIDE AN ENCOUNTER (OUTPATIENT)
Dept: URBAN - METROPOLITAN AREA CLINIC 86 | Facility: CLINIC | Age: 60
End: 2025-06-16

## 2025-08-05 ENCOUNTER — TELEPHONE ENCOUNTER (OUTPATIENT)
Dept: URBAN - METROPOLITAN AREA CLINIC 86 | Facility: CLINIC | Age: 60
End: 2025-08-05

## 2025-08-18 ENCOUNTER — LAB OUTSIDE AN ENCOUNTER (OUTPATIENT)
Dept: URBAN - METROPOLITAN AREA CLINIC 86 | Facility: CLINIC | Age: 60
End: 2025-08-18

## 2025-08-19 ENCOUNTER — LAB OUTSIDE AN ENCOUNTER (OUTPATIENT)
Dept: URBAN - METROPOLITAN AREA CLINIC 86 | Facility: CLINIC | Age: 60
End: 2025-08-19

## 2025-08-27 ENCOUNTER — OFFICE VISIT (OUTPATIENT)
Dept: URBAN - METROPOLITAN AREA TELEHEALTH 2 | Facility: TELEHEALTH | Age: 60
End: 2025-08-27
Payer: COMMERCIAL

## 2025-08-27 DIAGNOSIS — Z14.8 ALPHA-1-ANTITRYPSIN DEFICIENCY CARRIER: ICD-10-CM

## 2025-08-27 DIAGNOSIS — K21.9 GASTROESOPHAGEAL REFLUX DISEASE WITHOUT ESOPHAGITIS: ICD-10-CM

## 2025-08-27 DIAGNOSIS — I10 ESSENTIAL HYPERTENSION: ICD-10-CM

## 2025-08-27 DIAGNOSIS — R74.8 ELEVATED LIPASE: ICD-10-CM

## 2025-08-27 DIAGNOSIS — Z71.89 VACCINE COUNSELING: ICD-10-CM

## 2025-08-27 DIAGNOSIS — Z12.11 COLON CANCER SCREENING: ICD-10-CM

## 2025-08-27 DIAGNOSIS — Z98.890 HISTORY OF COLONOSCOPY: ICD-10-CM

## 2025-08-27 DIAGNOSIS — D57.3 SICKLE CELL TRAIT: ICD-10-CM

## 2025-08-27 DIAGNOSIS — K75.4 AUTOIMMUNE HEPATITIS: ICD-10-CM

## 2025-08-27 DIAGNOSIS — E66.811 CLASS 1 OBESITY DUE TO EXCESS CALORIES WITHOUT SERIOUS COMORBIDITY WITH BODY MASS INDEX (BMI) OF 31.0 TO 31.9 IN ADULT: ICD-10-CM

## 2025-08-27 DIAGNOSIS — E61.1 IRON DEFICIENCY: ICD-10-CM

## 2025-08-27 DIAGNOSIS — Z79.899 HIGH RISK MEDICATION USE: ICD-10-CM

## 2025-08-27 PROCEDURE — 99214 OFFICE O/P EST MOD 30 MIN: CPT

## 2025-08-27 RX ORDER — UBIDECARENONE/VIT E ACET 100MG-5
1 CAPSULE CAPSULE ORAL ONCE A DAY
Status: ON HOLD | COMMUNITY

## 2025-08-27 RX ORDER — MYCOPHENOLATE MOFETIL 250 MG/1
1 CAPSULES CAPSULE ORAL ONCE A DAY
Qty: 90 CAPSULE | Refills: 1 | Status: ACTIVE | COMMUNITY
Start: 2025-04-27

## 2025-08-27 RX ORDER — MYCOPHENOLATE MOFETIL 250 MG/1
1 CAPSULES CAPSULE ORAL ONCE A DAY
Qty: 90 CAPSULE | Refills: 1
Start: 2025-08-23

## 2025-08-27 RX ORDER — DOCUSATE SODIUM 100 MG/1
1 CAPSULE AS NEEDED CAPSULE ORAL ONCE A DAY
Status: ACTIVE | COMMUNITY